# Patient Record
Sex: FEMALE | Race: WHITE | NOT HISPANIC OR LATINO | Employment: FULL TIME | URBAN - METROPOLITAN AREA
[De-identification: names, ages, dates, MRNs, and addresses within clinical notes are randomized per-mention and may not be internally consistent; named-entity substitution may affect disease eponyms.]

---

## 2023-04-21 LAB
ALANINE AMINOTRANSFERASE (SGPT) (U/L) IN SER/PLAS: 20 U/L (ref 7–45)
ALBUMIN (G/DL) IN SER/PLAS: 4.3 G/DL (ref 3.4–5)
ALKALINE PHOSPHATASE (U/L) IN SER/PLAS: 49 U/L (ref 33–136)
ANION GAP IN SER/PLAS: 9 MMOL/L (ref 10–20)
ASPARTATE AMINOTRANSFERASE (SGOT) (U/L) IN SER/PLAS: 25 U/L (ref 9–39)
BASOPHILS (10*3/UL) IN BLOOD BY AUTOMATED COUNT: 0.01 X10E9/L (ref 0–0.1)
BASOPHILS/100 LEUKOCYTES IN BLOOD BY AUTOMATED COUNT: 0.2 % (ref 0–2)
BILIRUBIN TOTAL (MG/DL) IN SER/PLAS: 0.6 MG/DL (ref 0–1.2)
C REACTIVE PROTEIN (MG/L) IN SER/PLAS: <0.1 MG/DL
CALCIUM (MG/DL) IN SER/PLAS: 9.6 MG/DL (ref 8.6–10.3)
CARBON DIOXIDE, TOTAL (MMOL/L) IN SER/PLAS: 30 MMOL/L (ref 21–32)
CHLORIDE (MMOL/L) IN SER/PLAS: 102 MMOL/L (ref 98–107)
CREATININE (MG/DL) IN SER/PLAS: 0.87 MG/DL (ref 0.5–1.05)
EOSINOPHILS (10*3/UL) IN BLOOD BY AUTOMATED COUNT: 0.03 X10E9/L (ref 0–0.7)
EOSINOPHILS/100 LEUKOCYTES IN BLOOD BY AUTOMATED COUNT: 0.7 % (ref 0–6)
ERYTHROCYTE DISTRIBUTION WIDTH (RATIO) BY AUTOMATED COUNT: 13.2 % (ref 11.5–14.5)
ERYTHROCYTE MEAN CORPUSCULAR HEMOGLOBIN CONCENTRATION (G/DL) BY AUTOMATED: 32.4 G/DL (ref 32–36)
ERYTHROCYTE MEAN CORPUSCULAR VOLUME (FL) BY AUTOMATED COUNT: 93 FL (ref 80–100)
ERYTHROCYTES (10*6/UL) IN BLOOD BY AUTOMATED COUNT: 4.27 X10E12/L (ref 4–5.2)
GFR FEMALE: 76 ML/MIN/1.73M2
GLUCOSE (MG/DL) IN SER/PLAS: 89 MG/DL (ref 74–99)
HEMATOCRIT (%) IN BLOOD BY AUTOMATED COUNT: 39.8 % (ref 36–46)
HEMOGLOBIN (G/DL) IN BLOOD: 12.9 G/DL (ref 12–16)
IMMATURE GRANULOCYTES/100 LEUKOCYTES IN BLOOD BY AUTOMATED COUNT: 0 % (ref 0–0.9)
LEUKOCYTES (10*3/UL) IN BLOOD BY AUTOMATED COUNT: 4.3 X10E9/L (ref 4.4–11.3)
LYMPHOCYTES (10*3/UL) IN BLOOD BY AUTOMATED COUNT: 2.06 X10E9/L (ref 1.2–4.8)
LYMPHOCYTES/100 LEUKOCYTES IN BLOOD BY AUTOMATED COUNT: 48.5 % (ref 13–44)
MONOCYTES (10*3/UL) IN BLOOD BY AUTOMATED COUNT: 0.46 X10E9/L (ref 0.1–1)
MONOCYTES/100 LEUKOCYTES IN BLOOD BY AUTOMATED COUNT: 10.8 % (ref 2–10)
NEUTROPHILS (10*3/UL) IN BLOOD BY AUTOMATED COUNT: 1.69 X10E9/L (ref 1.2–7.7)
NEUTROPHILS/100 LEUKOCYTES IN BLOOD BY AUTOMATED COUNT: 39.8 % (ref 40–80)
PLATELETS (10*3/UL) IN BLOOD AUTOMATED COUNT: 188 X10E9/L (ref 150–450)
POTASSIUM (MMOL/L) IN SER/PLAS: 4.1 MMOL/L (ref 3.5–5.3)
PROTEIN TOTAL: 7.3 G/DL (ref 6.4–8.2)
SODIUM (MMOL/L) IN SER/PLAS: 137 MMOL/L (ref 136–145)
UREA NITROGEN (MG/DL) IN SER/PLAS: 18 MG/DL (ref 6–23)

## 2023-07-24 LAB
ALANINE AMINOTRANSFERASE (SGPT) (U/L) IN SER/PLAS: 13 U/L (ref 7–45)
ALBUMIN (G/DL) IN SER/PLAS: 4.4 G/DL (ref 3.4–5)
ALKALINE PHOSPHATASE (U/L) IN SER/PLAS: 56 U/L (ref 33–136)
ANION GAP IN SER/PLAS: 10 MMOL/L (ref 10–20)
ASPARTATE AMINOTRANSFERASE (SGOT) (U/L) IN SER/PLAS: 20 U/L (ref 9–39)
BASOPHILS (10*3/UL) IN BLOOD BY AUTOMATED COUNT: 0.02 X10E9/L (ref 0–0.1)
BASOPHILS/100 LEUKOCYTES IN BLOOD BY AUTOMATED COUNT: 0.4 % (ref 0–2)
BILIRUBIN TOTAL (MG/DL) IN SER/PLAS: 0.6 MG/DL (ref 0–1.2)
C REACTIVE PROTEIN (MG/L) IN SER/PLAS: 0.1 MG/DL
CALCIUM (MG/DL) IN SER/PLAS: 9.4 MG/DL (ref 8.6–10.3)
CARBON DIOXIDE, TOTAL (MMOL/L) IN SER/PLAS: 30 MMOL/L (ref 21–32)
CHLORIDE (MMOL/L) IN SER/PLAS: 101 MMOL/L (ref 98–107)
CREATININE (MG/DL) IN SER/PLAS: 0.83 MG/DL (ref 0.5–1.05)
EOSINOPHILS (10*3/UL) IN BLOOD BY AUTOMATED COUNT: 0.04 X10E9/L (ref 0–0.7)
EOSINOPHILS/100 LEUKOCYTES IN BLOOD BY AUTOMATED COUNT: 0.8 % (ref 0–6)
ERYTHROCYTE DISTRIBUTION WIDTH (RATIO) BY AUTOMATED COUNT: 13.2 % (ref 11.5–14.5)
ERYTHROCYTE MEAN CORPUSCULAR HEMOGLOBIN CONCENTRATION (G/DL) BY AUTOMATED: 33.3 G/DL (ref 32–36)
ERYTHROCYTE MEAN CORPUSCULAR VOLUME (FL) BY AUTOMATED COUNT: 91 FL (ref 80–100)
ERYTHROCYTES (10*6/UL) IN BLOOD BY AUTOMATED COUNT: 4.28 X10E12/L (ref 4–5.2)
GFR FEMALE: 80 ML/MIN/1.73M2
GLUCOSE (MG/DL) IN SER/PLAS: 100 MG/DL (ref 74–99)
HEMATOCRIT (%) IN BLOOD BY AUTOMATED COUNT: 39 % (ref 36–46)
HEMOGLOBIN (G/DL) IN BLOOD: 13 G/DL (ref 12–16)
IMMATURE GRANULOCYTES/100 LEUKOCYTES IN BLOOD BY AUTOMATED COUNT: 0.2 % (ref 0–0.9)
LEUKOCYTES (10*3/UL) IN BLOOD BY AUTOMATED COUNT: 5 X10E9/L (ref 4.4–11.3)
LYMPHOCYTES (10*3/UL) IN BLOOD BY AUTOMATED COUNT: 2.01 X10E9/L (ref 1.2–4.8)
LYMPHOCYTES/100 LEUKOCYTES IN BLOOD BY AUTOMATED COUNT: 40 % (ref 13–44)
MONOCYTES (10*3/UL) IN BLOOD BY AUTOMATED COUNT: 0.53 X10E9/L (ref 0.1–1)
MONOCYTES/100 LEUKOCYTES IN BLOOD BY AUTOMATED COUNT: 10.5 % (ref 2–10)
NEUTROPHILS (10*3/UL) IN BLOOD BY AUTOMATED COUNT: 2.42 X10E9/L (ref 1.2–7.7)
NEUTROPHILS/100 LEUKOCYTES IN BLOOD BY AUTOMATED COUNT: 48.1 % (ref 40–80)
PLATELETS (10*3/UL) IN BLOOD AUTOMATED COUNT: 199 X10E9/L (ref 150–450)
POTASSIUM (MMOL/L) IN SER/PLAS: 4 MMOL/L (ref 3.5–5.3)
PROTEIN TOTAL: 7.2 G/DL (ref 6.4–8.2)
SODIUM (MMOL/L) IN SER/PLAS: 137 MMOL/L (ref 136–145)
UREA NITROGEN (MG/DL) IN SER/PLAS: 17 MG/DL (ref 6–23)

## 2023-08-17 LAB
CHOLESTEROL (MG/DL) IN SER/PLAS: 206 MG/DL (ref 0–199)
CHOLESTEROL IN HDL (MG/DL) IN SER/PLAS: 86.8 MG/DL
CHOLESTEROL/HDL RATIO: 2.4
FASTING GLUCOSE (MG/DL) IN SER/PLAS: 92 MG/DL (ref 74–99)
LDL: 107 MG/DL (ref 0–99)
TRIGLYCERIDE (MG/DL) IN SER/PLAS: 59 MG/DL (ref 0–149)
VLDL: 12 MG/DL (ref 0–40)

## 2023-09-23 PROBLEM — M24.821: Status: ACTIVE | Noted: 2023-09-23

## 2023-09-23 PROBLEM — L40.50 PSORIATIC ARTHROPATHY (MULTI): Status: ACTIVE | Noted: 2023-09-23

## 2023-09-23 PROBLEM — M77.8 TENDINITIS OF RIGHT ELBOW: Status: ACTIVE | Noted: 2023-09-23

## 2023-09-23 PROBLEM — N20.0 KIDNEY STONE: Status: ACTIVE | Noted: 2023-09-23

## 2023-09-23 PROBLEM — E55.9 VITAMIN D DEFICIENCY: Status: ACTIVE | Noted: 2023-09-23

## 2023-09-23 PROBLEM — R59.0 LYMPHADENOPATHY OF HEAD AND NECK REGION: Status: ACTIVE | Noted: 2023-09-23

## 2023-09-23 PROBLEM — R53.83 FATIGUE: Status: ACTIVE | Noted: 2023-09-23

## 2023-09-23 PROBLEM — H69.92 DYSFUNCTION OF LEFT EUSTACHIAN TUBE: Status: ACTIVE | Noted: 2023-09-23

## 2023-09-23 PROBLEM — S52.90XA RADIUS FRACTURE: Status: ACTIVE | Noted: 2023-09-23

## 2023-09-23 PROBLEM — E78.5 HYPERLIPEMIA: Status: ACTIVE | Noted: 2023-09-23

## 2023-09-23 PROBLEM — N13.30 HYDRONEPHROSIS: Status: ACTIVE | Noted: 2023-09-23

## 2023-09-23 PROBLEM — M62.830 SPASM OF THORACIC BACK MUSCLE: Status: ACTIVE | Noted: 2023-09-23

## 2023-09-23 PROBLEM — M24.521: Status: ACTIVE | Noted: 2023-09-23

## 2023-09-23 RX ORDER — FOLIC ACID 1 MG/1
1 TABLET ORAL DAILY
COMMUNITY
Start: 2013-01-29 | End: 2023-11-09

## 2023-09-23 RX ORDER — ACETAMINOPHEN 500 MG
1 TABLET ORAL DAILY
COMMUNITY
Start: 2014-02-07

## 2023-09-23 RX ORDER — METHOTREXATE 2.5 MG/1
6 TABLET ORAL
COMMUNITY
Start: 2013-10-16 | End: 2023-10-16

## 2023-09-23 RX ORDER — FERROUS SULFATE 325(65) MG
TABLET ORAL
COMMUNITY

## 2023-09-23 RX ORDER — ADALIMUMAB 40MG/0.4ML
40 KIT SUBCUTANEOUS
COMMUNITY
Start: 2015-04-07 | End: 2023-12-18 | Stop reason: SDUPTHER

## 2023-09-23 RX ORDER — ACETAMINOPHEN 160 MG/5ML
SUSPENSION, ORAL (FINAL DOSE FORM) ORAL
COMMUNITY

## 2023-10-10 ENCOUNTER — TELEPHONE (OUTPATIENT)
Dept: PRIMARY CARE | Facility: CLINIC | Age: 63
End: 2023-10-10

## 2023-10-10 NOTE — TELEPHONE ENCOUNTER
Patient pulled her left glut a couple of weeks ago. She has tried topical creams and is stretching everyday, but it is still painful and now it is causing pain in her left knee.

## 2023-10-12 ENCOUNTER — OFFICE VISIT (OUTPATIENT)
Dept: PRIMARY CARE | Facility: CLINIC | Age: 63
End: 2023-10-12
Payer: COMMERCIAL

## 2023-10-12 VITALS
HEIGHT: 64 IN | DIASTOLIC BLOOD PRESSURE: 77 MMHG | SYSTOLIC BLOOD PRESSURE: 116 MMHG | HEART RATE: 69 BPM | OXYGEN SATURATION: 97 % | BODY MASS INDEX: 24.07 KG/M2 | WEIGHT: 141 LBS

## 2023-10-12 DIAGNOSIS — M54.16 LUMBAR RADICULOPATHY: Primary | ICD-10-CM

## 2023-10-12 PROCEDURE — 99213 OFFICE O/P EST LOW 20 MIN: CPT | Performed by: INTERNAL MEDICINE

## 2023-10-12 RX ORDER — PREDNISONE 20 MG/1
40 TABLET ORAL DAILY
Qty: 14 TABLET | Refills: 0 | Status: SHIPPED | OUTPATIENT
Start: 2023-10-12 | End: 2023-10-19

## 2023-10-12 NOTE — PROGRESS NOTES
"Subjective   Patient ID: Maryann Brennan is a 63 y.o. female who presents for Muscle Pain.    HPI   Felt L low back/buttocks muscles were tight about a month ago. Pain radiating around L hip and into L thigh/knee which started over the past two weeks. Hard to sleep.  Using some topicals. Tried motrin, aleve, nothing helping.  Pain is somewhat improved from onset.  L hip area feels tighter, notices when doing stretches, sitting cross legged.  No injury or overuse prior to onset.  No numbness or tingling in the left leg.    Review of Systems    Objective   /77   Pulse 69   Ht 1.626 m (5' 4\")   Wt 64 kg (141 lb)   SpO2 97%   BMI 24.20 kg/m²     Physical Exam  Constitutional:       General: She is not in acute distress.  Musculoskeletal:      Cervical back: No tenderness.      Thoracic back: No tenderness.      Lumbar back: No tenderness.      Comments: Full ROM of L hip but some pain elicited in L groin with internal rotation.  Straight leg raise + on L   Neurological:      Motor: No weakness.      Gait: Gait normal.      Deep Tendon Reflexes:      Reflex Scores:       Patellar reflexes are 2+ on the right side and 2+ on the left side.        Assessment/Plan   Problem List Items Addressed This Visit             ICD-10-CM    Lumbar radiculopathy - Primary M54.16    Relevant Medications    predniSONE (Deltasone) 20 mg tablet    Other Relevant Orders    Referral to Physical Therapy          Will treat with prednisone course and start PT. Advised to return after 6 weeks of PT if not significantly better.  "

## 2023-10-14 DIAGNOSIS — L40.50 PSORIATIC ARTHRITIS (MULTI): Primary | ICD-10-CM

## 2023-10-16 RX ORDER — METHOTREXATE 2.5 MG/1
TABLET ORAL
Qty: 24 TABLET | Refills: 2 | Status: SHIPPED | OUTPATIENT
Start: 2023-10-16 | End: 2024-01-08

## 2023-10-23 ENCOUNTER — OFFICE VISIT (OUTPATIENT)
Dept: RHEUMATOLOGY | Facility: CLINIC | Age: 63
End: 2023-10-23
Payer: COMMERCIAL

## 2023-10-23 ENCOUNTER — LAB (OUTPATIENT)
Dept: LAB | Facility: LAB | Age: 63
End: 2023-10-23
Payer: COMMERCIAL

## 2023-10-23 VITALS
HEART RATE: 76 BPM | TEMPERATURE: 96.7 F | BODY MASS INDEX: 24.41 KG/M2 | HEIGHT: 64 IN | OXYGEN SATURATION: 98 % | WEIGHT: 143 LBS | SYSTOLIC BLOOD PRESSURE: 110 MMHG | DIASTOLIC BLOOD PRESSURE: 70 MMHG

## 2023-10-23 DIAGNOSIS — L40.50 PSORIATIC ARTHROPATHY (MULTI): Primary | ICD-10-CM

## 2023-10-23 DIAGNOSIS — L40.50 PSORIATIC ARTHROPATHY (MULTI): ICD-10-CM

## 2023-10-23 DIAGNOSIS — M85.89 OSTEOPENIA OF MULTIPLE SITES: ICD-10-CM

## 2023-10-23 PROBLEM — J20.9 ACUTE BRONCHITIS: Status: ACTIVE | Noted: 2023-10-23

## 2023-10-23 PROBLEM — J01.10 ACUTE FRONTAL SINUSITIS: Status: ACTIVE | Noted: 2023-10-23

## 2023-10-23 PROBLEM — H61.20 IMPACTED CERUMEN: Status: ACTIVE | Noted: 2023-10-23

## 2023-10-23 PROBLEM — R05.9 COUGH: Status: ACTIVE | Noted: 2023-10-23

## 2023-10-23 PROBLEM — R51.9 HEADACHE: Status: ACTIVE | Noted: 2023-10-23

## 2023-10-23 PROBLEM — N20.0 KIDNEY STONE: Status: RESOLVED | Noted: 2023-09-23 | Resolved: 2023-10-23

## 2023-10-23 PROBLEM — R35.0 INCREASED FREQUENCY OF URINATION: Status: ACTIVE | Noted: 2023-10-23

## 2023-10-23 PROBLEM — M71.9 DISORDER OF BURSAE OF SHOULDER REGION: Status: ACTIVE | Noted: 2018-04-20

## 2023-10-23 PROBLEM — M71.9 BURSITIS: Status: ACTIVE | Noted: 2023-10-23

## 2023-10-23 PROBLEM — M10.9 GOUT: Status: ACTIVE | Noted: 2023-10-23

## 2023-10-23 PROBLEM — H66.90 OTITIS MEDIA: Status: ACTIVE | Noted: 2023-10-23

## 2023-10-23 PROBLEM — N95.2 ATROPHY OF VAGINA: Status: ACTIVE | Noted: 2022-09-26

## 2023-10-23 PROBLEM — F41.1 ANXIETY STATE: Status: ACTIVE | Noted: 2023-10-23

## 2023-10-23 PROBLEM — N39.0 URINARY TRACT INFECTIOUS DISEASE: Status: ACTIVE | Noted: 2018-04-20

## 2023-10-23 PROBLEM — M79.7 FIBROMYOSITIS: Status: ACTIVE | Noted: 2023-10-23

## 2023-10-23 PROBLEM — J02.9 ACUTE PHARYNGITIS: Status: ACTIVE | Noted: 2023-10-23

## 2023-10-23 PROBLEM — N13.30 HYDRONEPHROSIS: Status: RESOLVED | Noted: 2023-09-23 | Resolved: 2023-10-23

## 2023-10-23 PROBLEM — L40.9 PSORIASIS: Status: ACTIVE | Noted: 2023-10-23

## 2023-10-23 LAB
ALBUMIN SERPL BCP-MCNC: 4.2 G/DL (ref 3.4–5)
ALP SERPL-CCNC: 67 U/L (ref 33–136)
ALT SERPL W P-5'-P-CCNC: 15 U/L (ref 7–45)
ANION GAP SERPL CALC-SCNC: 8 MMOL/L (ref 10–20)
AST SERPL W P-5'-P-CCNC: 17 U/L (ref 9–39)
BASOPHILS # BLD AUTO: 0.02 X10*3/UL (ref 0–0.1)
BASOPHILS NFR BLD AUTO: 0.3 %
BILIRUB SERPL-MCNC: 0.4 MG/DL (ref 0–1.2)
BUN SERPL-MCNC: 18 MG/DL (ref 6–23)
CALCIUM SERPL-MCNC: 9.3 MG/DL (ref 8.6–10.3)
CHLORIDE SERPL-SCNC: 100 MMOL/L (ref 98–107)
CO2 SERPL-SCNC: 33 MMOL/L (ref 21–32)
CREAT SERPL-MCNC: 0.81 MG/DL (ref 0.5–1.05)
CRP SERPL-MCNC: 0.19 MG/DL
EOSINOPHIL # BLD AUTO: 0.08 X10*3/UL (ref 0–0.7)
EOSINOPHIL NFR BLD AUTO: 1.3 %
ERYTHROCYTE [DISTWIDTH] IN BLOOD BY AUTOMATED COUNT: 13.5 % (ref 11.5–14.5)
GFR SERPL CREATININE-BSD FRML MDRD: 83 ML/MIN/1.73M*2
GLUCOSE SERPL-MCNC: 87 MG/DL (ref 74–99)
HCT VFR BLD AUTO: 44 % (ref 36–46)
HGB BLD-MCNC: 14.2 G/DL (ref 12–16)
IMM GRANULOCYTES # BLD AUTO: 0.03 X10*3/UL (ref 0–0.7)
IMM GRANULOCYTES NFR BLD AUTO: 0.5 % (ref 0–0.9)
LYMPHOCYTES # BLD AUTO: 2.31 X10*3/UL (ref 1.2–4.8)
LYMPHOCYTES NFR BLD AUTO: 36.5 %
MCH RBC QN AUTO: 30.3 PG (ref 26–34)
MCHC RBC AUTO-ENTMCNC: 32.3 G/DL (ref 32–36)
MCV RBC AUTO: 94 FL (ref 80–100)
MONOCYTES # BLD AUTO: 1.04 X10*3/UL (ref 0.1–1)
MONOCYTES NFR BLD AUTO: 16.4 %
NEUTROPHILS # BLD AUTO: 2.85 X10*3/UL (ref 1.2–7.7)
NEUTROPHILS NFR BLD AUTO: 45 %
NRBC BLD-RTO: 0 /100 WBCS (ref 0–0)
PLATELET # BLD AUTO: 232 X10*3/UL (ref 150–450)
PMV BLD AUTO: 9 FL (ref 7.5–11.5)
POTASSIUM SERPL-SCNC: 4.4 MMOL/L (ref 3.5–5.3)
PROT SERPL-MCNC: 7 G/DL (ref 6.4–8.2)
RBC # BLD AUTO: 4.68 X10*6/UL (ref 4–5.2)
SODIUM SERPL-SCNC: 137 MMOL/L (ref 136–145)
WBC # BLD AUTO: 6.3 X10*3/UL (ref 4.4–11.3)

## 2023-10-23 PROCEDURE — 36415 COLL VENOUS BLD VENIPUNCTURE: CPT

## 2023-10-23 PROCEDURE — 1036F TOBACCO NON-USER: CPT | Performed by: INTERNAL MEDICINE

## 2023-10-23 PROCEDURE — 85025 COMPLETE CBC W/AUTO DIFF WBC: CPT

## 2023-10-23 PROCEDURE — 99214 OFFICE O/P EST MOD 30 MIN: CPT | Performed by: INTERNAL MEDICINE

## 2023-10-23 PROCEDURE — 80053 COMPREHEN METABOLIC PANEL: CPT

## 2023-10-23 PROCEDURE — 86140 C-REACTIVE PROTEIN: CPT

## 2023-10-23 ASSESSMENT — PATIENT HEALTH QUESTIONNAIRE - PHQ9
SUM OF ALL RESPONSES TO PHQ9 QUESTIONS 1 AND 2: 0
2. FEELING DOWN, DEPRESSED OR HOPELESS: NOT AT ALL
1. LITTLE INTEREST OR PLEASURE IN DOING THINGS: NOT AT ALL
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
## 2023-10-31 ENCOUNTER — EVALUATION (OUTPATIENT)
Dept: PHYSICAL THERAPY | Facility: CLINIC | Age: 63
End: 2023-10-31
Payer: COMMERCIAL

## 2023-10-31 VITALS — OXYGEN SATURATION: 98 % | HEART RATE: 72 BPM

## 2023-10-31 DIAGNOSIS — M54.16 LUMBAR RADICULOPATHY: ICD-10-CM

## 2023-10-31 PROCEDURE — 97161 PT EVAL LOW COMPLEX 20 MIN: CPT | Mod: GP

## 2023-10-31 PROCEDURE — 97530 THERAPEUTIC ACTIVITIES: CPT | Mod: GP

## 2023-10-31 ASSESSMENT — PATIENT HEALTH QUESTIONNAIRE - PHQ9
SUM OF ALL RESPONSES TO PHQ9 QUESTIONS 1 AND 2: 0
2. FEELING DOWN, DEPRESSED OR HOPELESS: NOT AT ALL
2. FEELING DOWN, DEPRESSED OR HOPELESS: NOT AT ALL
SUM OF ALL RESPONSES TO PHQ9 QUESTIONS 1 AND 2: 0
1. LITTLE INTEREST OR PLEASURE IN DOING THINGS: NOT AT ALL
1. LITTLE INTEREST OR PLEASURE IN DOING THINGS: NOT AT ALL

## 2023-10-31 ASSESSMENT — PAIN DESCRIPTION - DESCRIPTORS: DESCRIPTORS: ACHING;DULL

## 2023-10-31 ASSESSMENT — PAIN SCALES - GENERAL: PAINLEVEL_OUTOF10: 2

## 2023-10-31 ASSESSMENT — ACTIVITIES OF DAILY LIVING (ADL): ADL_ASSISTANCE: INDEPENDENT

## 2023-10-31 ASSESSMENT — PAIN - FUNCTIONAL ASSESSMENT: PAIN_FUNCTIONAL_ASSESSMENT: 0-10

## 2023-10-31 NOTE — Clinical Note
October 31, 2023     Patient: Maryann Brennan   YOB: 1960   Date of Visit: 10/31/2023       To Whom It May Concern:    It is my medical opinion that Maryann Brennan {Work release (duty restriction):85605}.    If you have any questions or concerns, please don't hesitate to call.         Sincerely,        Zoila Barrett, PT    CC: No Recipients

## 2023-10-31 NOTE — Clinical Note
October 31, 2023     Patient: Maryann Brennan   YOB: 1960   Date of Visit: 10/31/2023       To Whom it May Concern:    Maryann Brennan was seen in my clinic on 10/31/2023. She {Return to school/sport:44236}.    If you have any questions or concerns, please don't hesitate to call.         Sincerely,          Zoila Barrett, PT        CC: No Recipients

## 2023-10-31 NOTE — PROGRESS NOTES
Physical Therapy    Physical Therapy Evaluation and Treatment      Patient Name: Maryann Brennan  MRN: [id number]  Today's Date: 10/31/2023  Time Calculation  Start Time: 0750  Stop Time: 0835  Time Calculation (min): 45 min    Billing: Minutes  Evaluation:  Evaluation:  (Low):  25  Treatment:   Therapeutic Activity:  20    Assessment:  63yr F pt presents to physical therapy with complaints of L glute pain that at times radiates down into the L LE down to the knee. During examination patient has limitations in hip AROM/PROM specifically IR on the L LE; decreased proximal hip strength, decreased static balance, minimal gait dysfunction, decreased functional mobility and tolerance with activity. Patient is not a fall risk at this time.pt would benefit from continued skilled physical therapy to maximize pt safety, increase tolerance to activity and to address impairments to restore PLOF with ADLs, functional mobility and participation in activities.    PT Assessment Results: Decreased strength, Decreased range of motion, Decreased mobility, Pain  Evaluation/Treatment Tolerance: Patient tolerated treatment well  Strengths: Ability to acquire knowledge    Plan:  Treatment/Interventions: Cryotherapy, Education/ Instruction, Gait training, Manual therapy, Neuromuscular re-education, Self care/ home management, Therapeutic activities, Therapeutic exercises  PT Plan: Skilled PT, Other (Comment) (Start on HEP for LE Proximal Hip Strengthening; Nustep warm up; Review stretches for piriformis)  PT Frequency: Other (Comment) (Initially 2x/week then progress to 1x/week)  Duration: 4 weeks  Onset Date: 10/12/23  Rehab Potential: Good  Plan of Care Agreement: Patient  Visit #1   Insurance Reviewed (per information provided by  pre-cert team)  Authorization required:  No   Preferred Name:  Maryann Brand      Current Problem:   1. Lumbar radiculopathy  Referral to Physical Therapy    Follow Up In Physical Therapy          Subjective     General:  General  Reason for Referral: L Hip/Glute Radiculopathy  Referred By: Peter Luevano MD  Past Medical History Relevant to Rehab: Anemia, Psoriatic Arthritis, Wrist Fx R ; Wrist Fx Left  (Plate)  General Comment: Per pt report she thinks she hurt herself during exercising - can usually work through it when she changes her exercises; she had a stretch were the pain was keeping her up at night. She could feel it start on her L Glute/Hip and radiate down into her L knee; Ibprofun did not help; aleve did not help; had nights with tossing and turning; she can sleep if she was on her stomach with her L Leg up. She was put on 7 days of predisone. It got better; she is able to sleep through the night. Sometimes she feels a knot in her glute and feels like she can get a release and sometimes she can feel a spot that goes all the way down the ankle; has been completing the figure 4 and pirfiformis stretches and it has helped some; she knows her L hip tighter than it used to be; has been foam rolling her L Glute and hip flexors; has also been massage gunning. Wants better flexibility, and to decrease pain. She knows she has to be more careful when she sits; more increased pain levels at night. Reports no falls in the last 6 months; fell in  on the ice and broke her wrist; fell on her glutes. Reports she did have difficulty lifting her leg going up the steps and getting into the car - it has been a couple of weeks since then and now she can do the steps.  Precautions:  Precautions  Medical Precautions: No known precautions/limitation  Precautions Comment: Low Fall Risk  Vital Signs:  Vital Signs  Heart Rate: 72  SpO2: 98 %  Pain:  Pain Assessment  Pain Assessment: 0-10  Pain Score: 2  Pain Type: Chronic pain  Pain Location: Hip (Glute)  Pain Orientation: Left  Pain Descriptors: Aching, Dull  Pain Frequency: Intermittent  Home Livin story home; 2 stairs; 10 steps to 2nd/floor and to basement (HR  uses intermittently); 2nd floor bed/bath - tub shower with grab bar   Prior Level of Function:  Prior Function Per Pt/Caregiver Report  Level of Downs: Independent with ADLs and functional transfers, Independent with homemaking with ambulation  ADL Assistance: Independent  Homemaking Assistance: Independent  Ambulatory Assistance: Independent  Vocational: Full time employment (Work at [employer])  Hand Dominance: Right    Objective   General Assessments:  Posture Comment: Seated: rounded shoulders, mod fwd head, minimal increased thoracic kyohosis    Palpation Comment: No pain with palpation to the L/S and L Hip   Special Tests Comment: OLIVIA (+) L Hip; Scour (-) in BL  Functional Assessments:  Gait Comment: Minimal anatalgic gait noted; ambulates for 75+ ft in clinic with no AD; decreased heel strike/push off on L LE  , Balance Comment: 4 Stage Balance: NBOS, 10s, R SemiTandem: 10s, R Tandem; 2s; SLS 10s BL; L LE Stance Leg Symptomatic    , Stairs Comment: Ascends/Descends 4 6in steps in a step over step pattern with no use of rail; good speed and jacqueline  , Bed Mobility Comment: Mat table mobility IND  , and Transfers Comment: IND  Extremity/Trunk Assessments:  RLE   RLE : Exceptions to WFL  AROM RLE (degrees)  RLE AROM Comment: WFL  PROM RLE (degrees)  RLE PROM Comment: WFL  Strength RLE  RLE Overall Strength: Deficits  R Hip Flexion: 4+/5  R Hip ABduction: 4/5  R Hip ADduction: 4/5  R Knee Flexion: 5/5  R Knee Extension: 5/5  R Ankle Dorsiflexion: 5/5  R Ankle Plantar Flexion: 5/5  LLE   LLE : Exceptions to WFL  AROM LLE (degrees)  LLE AROM Comment: WFL  PROM LLE (degrees)  LLE PROM Comment: WFL except L IR (Mod Defificits)  Strength LLE  L Hip Flexion: 4/5  L Hip ABduction: 4/5  L Hip ADduction: 4/5  L Knee Flexion: 4/5  L Knee Extension: 5/5  L Ankle Dorsiflexion: 5/5  L Ankle Plantar Flexion: 5/5    Lumbar Spine  Lumbar Palpation/Joint Mobility Assessment  Palpation/Joint Mobility Comment: No pain with  palpation to the L/S and Sacrum  Lumbar AROM  Lumbar AROM WFL: yes  Lumbar flexion: (60°): WFL  Lumbar extension (25°): WFL; Pressure/Tightness on L Glute  Lumbar rotation right (30°): WFL  Lumbar rotation left (30°): WFL  Lumbar sidebend right (25°): WFL  Lumbar sidebend left (25°): WFL  Hip AROM  Hip AROM WFL: no  R Hip Flexion (125°): 125  L Hip Flexion (125°): 125  R Hip Abduction (45°): 45  L Hip Abduction  (45°): 45 pain with movement at end range  R Hip Extension (10°): 10  L Hip Extension (10°): 10  R hip ER: (45°): 45  L hip ER: (45°): 45  R hip IR: (45°): 45  L hip IR: (45°): 10; painful  Lumbar Myotomes  Lumbar Myotomes WFL: no  R Hip Flex : 4+/5  L Hip Flex (L2): (5/5): 4/5  R Knee Ext (L3): (5/5): 5/5  L Knee Ext (L3): (5/5) : 4/5  R Ankle DF (L4): (5/5): 5/5  L Ankle DF (L4): (5/5): 5/5  R Great Toe Ext (L5): (5/5) : 5/5  L Great Toe Ext (L5): (5/5): 5/5  R Ankle EV (S1): (5/5): 5/5  L Ankle EV (S1): (5/5): 5/5  Specific Lower Extremity MMT  Specific Lower Extremity MMT WFL: no  R Gluteals (prone): (5/5): 4/5  L Gluteals (prone): (5/5): 4-/5  R Hip External Rotation: (5/5): 5/5  L Hip External Rotation: (5/5): 5/5  Dermatomes  Dermatomes WFL: yes  R Anterior / Medial thigh ( L2, L3): WFL  L Anterior / Medial thigh ( L2, L3): WFL  R Medial ankle (L4): WFL  L Medial ankle (L4): WFL  R Lateral calf/dorsal/plantar surface of foot, digit 1-4 (L5): WFL  L Lateral calf/dorsal/plantar surface of foot, digit 1-4 (L5): WFL  R Digit 5 (S1): WFL  L Digit 5 (S1): WFL  Special Tests  Supine SLR: (Negative): (+) > 90 L LE; R LE WNL     Outcome Measures:  Other Measures  5x Sit to Stand: 11.49 with no UE assist, standardized chair height  Oswestry Disablity Index (RAFAEL): 0/50     Treatments:  Therapeutic Activity  Therapeutic Activity Performed: Yes  1. Repeated sit to/from stands from standardized chair height. Required VC for no use of hands, nose over toes, full upright stand from chair, use of anterior shift with  fwd momentum and eccentric control into chair.  2. Functional Performance via stairs: Ascends/Descends 4 6in steps at close supervision with no assistive device, no use of HR   3. Functional mobility via AROM/PROM of LE; Verbal cues for sequencing/positioning.  4. Functional Performance via MMT of LE: Hip, knee, ankle; Verbal cues for sequencing/positioning.   5. Educated pt on POC, goals of physical therapy, purpose of physical therapy, as well as the benefits in participating in physical therapy to increase functional mobility and maximize pt safety.     OP EDUCATION:  Education  Individual(s) Educated: Patient  Education Provided: Anatomy, Body Mechanics, Fall Risk, Home Exercise Program, Home Safety, POC, Physiology, Posture  Risk and Benefits Discussed with Patient/Caregiver/Other: yes  Patient/Caregiver Demonstrated Understanding: yes  Plan of Care Discussed and Agreed Upon: yes  Patient Response to Education: Patient/Caregiver Verbalized Understanding of Information, Patient/Caregiver Performed Return Demonstration of Exercises/Activities, Patient/Caregiver Asked Appropriate Questions    Goals:  STG: pt will be independent in HEP & symptom management    LTGs:  Pain: pt will demonstrate a 2 pt improvement pain on the VAS scale, allowing for improved tolerance to perform daily functional activities.     ROM: pt will demonstrate no losses in PROM/AROM in the Hip without onset of pain, allowing for a normal gait pattern.     Strength: Pt will improve B LE Strength to >/= 4+/5 to increase functional performance, tolerance to activity, and enhancing pt safety to particpate in ADLs and IADLs.    Gait: pt will demonstrate normal mechanics without pain during gait and performance of stairs, allowing for pt to return to navigating the community.     5xSTS: pt will perform 5x sit to  < 15 seconds to decrease fall risk. OR MCID 2.3s Baseline 11.49sec (10/31/23)     Stairs: pt will ascend/descend step over step  (6in step) with proper gait mechanics and use of <1HR to promote functional mobility and improve functional performance.

## 2023-11-06 ENCOUNTER — TREATMENT (OUTPATIENT)
Dept: PHYSICAL THERAPY | Facility: CLINIC | Age: 63
End: 2023-11-06
Payer: COMMERCIAL

## 2023-11-06 DIAGNOSIS — M54.16 LUMBAR RADICULOPATHY: ICD-10-CM

## 2023-11-06 PROCEDURE — 97110 THERAPEUTIC EXERCISES: CPT | Mod: GP

## 2023-11-06 ASSESSMENT — PAIN SCALES - GENERAL: PAINLEVEL_OUTOF10: 4

## 2023-11-06 ASSESSMENT — PAIN - FUNCTIONAL ASSESSMENT: PAIN_FUNCTIONAL_ASSESSMENT: 0-10

## 2023-11-06 NOTE — PROGRESS NOTES
Physical Therapy    Physical Therapy Progress Note     Patient Name: Maryann Brennan  MRN: [id number]  Today's Date: 11/6/2023  Time Calculation  Start Time: 1100  Stop Time: 1140  Time Calculation (min): 40 min  Billing: Minutes   Treatment:   Therapeutic Exercise:  38      Assessment:  PT Assessment  PT Assessment Results: Decreased strength, Decreased range of motion, Decreased mobility, Pain  Evaluation/Treatment Tolerance: Patient tolerated treatment well  Strengths: Ability to acquire knowledge  pt tolerated treatment well, did well with initiation of LE supine proximal hip strengthening. pt needs continued work on strengthening and core stability paired with proximal hip strengthening. pt left session with all questions answered.     Plan:  OP PT Plan  Treatment/Interventions: Cryotherapy, Education/ Instruction, Gait training, Manual therapy, Neuromuscular re-education, Self care/ home management, Therapeutic activities, Therapeutic exercises  PT Plan: Skilled PT (Hip hikes, nu step, stair stretches, squats, progress bridges and LE supine exercises)   PT Frequency: Other (Comment) (Initially 2x/week then progress to 1x/week)  Duration: 4 weeks  Onset Date: 10/12/23  Rehab Potential: Good  Plan of Care Agreement: Patient  Visit #2   Insurance Reviewed (per information provided by  pre-cert team)  Authorization required:  No   Preferred Name:  Maryann Brand    Current Problem  1. Lumbar radiculopathy  Follow Up In Physical Therapy          Subjective   General  Reason for Referral: L Hip/Glute Radiculopathy  Referred By: Peter Luevano MD  Past Medical History Relevant to Rehab: Anemia, Psoriatic Arthritis, Wrist Fx R 1998; Wrist Fx Left 2022 (Plate)  General Comment: Per pt report was in the car for 4.5hrs and felt a little stiff after but nothing different from usual. Woke up a few times last night and had achy pain in the L glute  Precautions  Precautions  Medical Precautions: No known  precautions/limitation  Precautions Comment: Low Fall Risk  Pain  Pain Assessment: 0-10  Pain Score: 4  Pain Type: Chronic pain  Pain Location: Other (Comment) (Glute)  Pain Orientation: Left    Objective   Posture  Posture Comment: Rounded shoulders; increased thoracici kyphosis    Gait: Ambulates in clinic for 75+ft in clinic with minimal anatalgic gait noted; decreased heel strike/push off on L LE     Treatments:  Therapeutic Exercise  Therapeutic Exercise Performed: Yes  Therapeutic Exercise Activity 1: Nustep for 8 min; work level 3 to increase functional mobility and tolerance with activity  Therapeutic Exercise Activity 2: Bridge with ball squeeze 2x15  Therapeutic Exercise Activity 3: SLR BL 2x15 with active quad set  Therapeutic Exercise Activity 4: SL Clamshell x15  Therapeutic Exercise Activity 5: SL Hip Flexion Bent Knee: x  Therapeutic Exercise Activity 6: SL Hip Circles CW/CCW x10 BL  Therapeutic Exercise Activity 7: Supine Marches x15  Therapeutic Exercise Activity 8: Supine Piriformis Stretch BL 2x30s  Therapeutic Exercise Activity 9: Supine Birddog x10 BL    OP EDUCATION:  Education  Individual(s) Educated: Patient  Education Provided: Anatomy, Body Mechanics, Fall Risk, Home Exercise Program, Home Safety, POC, Physiology, Posture  Risk and Benefits Discussed with Patient/Caregiver/Other: yes  Patient/Caregiver Demonstrated Understanding: yes  Plan of Care Discussed and Agreed Upon: yes  Patient Response to Education: Patient/Caregiver Verbalized Understanding of Information, Patient/Caregiver Performed Return Demonstration of Exercises/Activities, Patient/Caregiver Asked Appropriate Questions  Access Code: [email or web address]  URL: [email or web address]  Date: 11/06/2023  Prepared by: Zoila Barrett    Exercises  - Bird Dog  - 1 x daily - 3 sets - 10 reps  - Supine March  - 1 x daily - 3 sets - 10 reps  - Supine Piriformis Stretch with Foot on Ground  - 1 x daily - 3 sets - 10 reps  -  Supine Bridge with Mini Swiss Ball Between Knees  - 1 x daily - 3 sets - 10 reps  - Active Straight Leg Raise with Quad Set  - 1 x daily - 3 sets - 10 reps  - Sidelying Hip Circles  - 1 x daily - 3 sets - 10 reps  - Sidelying Bent Knee Hip Flexion  - 1 x daily - 3 sets - 10 reps  - Clamshell  - 1 x daily - 3 sets - 10 reps

## 2023-11-09 ENCOUNTER — TREATMENT (OUTPATIENT)
Dept: PHYSICAL THERAPY | Facility: CLINIC | Age: 63
End: 2023-11-09
Payer: COMMERCIAL

## 2023-11-09 DIAGNOSIS — M54.16 LUMBAR RADICULOPATHY: ICD-10-CM

## 2023-11-09 DIAGNOSIS — L40.50 PSORIATIC ARTHRITIS (MULTI): Primary | ICD-10-CM

## 2023-11-09 PROCEDURE — 97110 THERAPEUTIC EXERCISES: CPT | Mod: GP,CQ

## 2023-11-09 RX ORDER — FOLIC ACID 1 MG/1
1 TABLET ORAL DAILY
Qty: 30 TABLET | Refills: 5 | Status: SHIPPED | OUTPATIENT
Start: 2023-11-09 | End: 2024-05-03

## 2023-11-09 ASSESSMENT — PAIN DESCRIPTION - DESCRIPTORS: DESCRIPTORS: ACHING;DULL

## 2023-11-09 ASSESSMENT — PAIN - FUNCTIONAL ASSESSMENT: PAIN_FUNCTIONAL_ASSESSMENT: 0-10

## 2023-11-09 ASSESSMENT — PAIN SCALES - GENERAL: PAINLEVEL_OUTOF10: 4

## 2023-11-09 NOTE — PROGRESS NOTES
Physical Therapy    Physical Therapy Treatment    Patient Name: Maryann Brennan  MRN: [id number]  Today's Date: 11/9/2023  Time Calculation  Start Time: 1050  Stop Time: 1135  Time Calculation (min): 45 min  Billing:  Therex:  45 minutes      Assessment:   Reviewed HEP.  Popping in L. Hip joint with sidelying hip flexion, minimized with decreased abduction in sidelying.  Progressed patient with hip hikes, partial squats, and isometric piriformis ex.  Added addition of resistance to clamshells.  Pt. Able to tolerate advancements in program without increased symptoms.    Skilled PT:  Therapeutic exercise progression, patient education    Plan:   OP PT Plan  Treatment/Interventions: Cryotherapy, Education/ Instruction, Gait training, Manual therapy, Neuromuscular re-education, Self care/ home management, Therapeutic activities, Therapeutic exercises  PT Frequency: Other (Comment) (Initially 2x/week then progress to 1x/week)  Duration: 4 weeks  Onset Date: 10/12/23  Rehab Potential: Good  Plan of Care Agreement: Patient  Issue RTB for clamshells at home and add eccentric tap downs next visit.     Visit #3  Insurance Reviewed (per information provided by  pre-cert team)  Authorization required:  No   Preferred Name:  Maryann Brand  Current Problem  1. Lumbar radiculopathy  Follow Up In Physical Therapy          Subjective   General  Reason for Referral: L Hip/Glute Radiculopathy  Referred By: Peter Luevano MD  Past Medical History Relevant to Rehab: Anemia, Psoriatic Arthritis, Wrist Fx R 1998; Wrist Fx Left 2022 (Plate)    Precautions  Medical Precautions: No known precautions/limitation  Precautions Comment: Low Fall Risk     Pain  Pain Assessment: 0-10  Pain Score: 4  Pain Type: Chronic pain  Pain Location: Hip  Pain Orientation: Left  Pain Descriptors: Aching, Dull  Pain Frequency: Intermittent    Objective     Posture   Valgus presentation of amari knees in standing initially.    Treatments:   Therapeutic Exercise  "Performed: Yes  Nustep for 8 min; work level 3 to increase functional mobility and tolerance with activity  Hamstring/Hip Flexor stair stretches 30\" x 2 amari, each  Bridge with ball squeeze 2x15  SLR BL 2x15 with active quad set  RTB SL Clamshell x15  SL Hip Flexion Bent Knee: x  SL Hip Circles CW/CCW x10 BL  Supine DLS GTB Marches 2 x 10  Supine Piriformis Stretch BL 2x30s  Supine Birddog x10 BL  Partial squats 2 x 10  Hooklying GTB hip abduction 2 x 10  Prone piriiformis isometrics with ball squeeze between ankles 3\" hold x 10  Hip hikes off 2\" box x 10 amari    OP EDUCATION:   V/c for correct technique and posture with exercises.        "

## 2023-11-13 ENCOUNTER — APPOINTMENT (OUTPATIENT)
Dept: PHYSICAL THERAPY | Facility: CLINIC | Age: 63
End: 2023-11-13
Payer: COMMERCIAL

## 2023-11-16 ENCOUNTER — TREATMENT (OUTPATIENT)
Dept: PHYSICAL THERAPY | Facility: CLINIC | Age: 63
End: 2023-11-16
Payer: COMMERCIAL

## 2023-11-16 DIAGNOSIS — M54.16 LUMBAR RADICULOPATHY: ICD-10-CM

## 2023-11-16 PROCEDURE — 97110 THERAPEUTIC EXERCISES: CPT | Mod: GP,CQ

## 2023-11-16 ASSESSMENT — PAIN - FUNCTIONAL ASSESSMENT: PAIN_FUNCTIONAL_ASSESSMENT: 0-10

## 2023-11-16 ASSESSMENT — PAIN SCALES - GENERAL: PAINLEVEL_OUTOF10: 2

## 2023-11-16 NOTE — PROGRESS NOTES
"Physical Therapy    Physical Therapy Treatment    Patient Name: Maryann Brennan  MRN: [id number]  Today's Date: 11/16/2023  Time in/out: 11:30-12:15  Visit 4         Assessment:  Pt experienced dec in radicular pain w/ prone exs today. Pt had inc pain down L LE w/ piriformis stretch but was able to modify to reduce symptoms. Pt required verbal cues for breathing w/ exs     Plan:  OP PT Plan  Treatment/Interventions: Cryotherapy, Education/ Instruction, Gait training, Manual therapy, Neuromuscular re-education, Self care/ home management, Therapeutic activities, Therapeutic exercises  PT Plan: Skilled PT (Start on HEP for LE Proximal Hip Strengthening; Nustep warm up; Review stretches for piriformis)  PT Frequency: Other (Comment) (Initially 2x/week then progress to 1x/week)  Duration: 4 weeks  Onset Date: 10/12/23  Rehab Potential: Good  Plan of Care Agreement: Patient    Visit #4  Insurance Reviewed (per information provided by  pre-cert team)  Authorization required:  No   Preferred Name:  Maryann Brand    Current Problem  1. Lumbar radiculopathy  Follow Up In Physical Therapy          General     General  Reason for Referral: L Hip/Glute Radiculopathy  Referred By: Peter Luevano MD  Past Medical History Relevant to Rehab: Anemia, Psoriatic Arthritis, Wrist Fx R 1998; Wrist Fx Left 2022 (Plate)    Subjective    Pt states L hip  area is sore. Pt states pain goes down leg at times and gets tightness on inside of L thigh.    Precautions  Precautions  Medical Precautions: No known precautions/limitation       Pain  Pain Assessment  Pain Assessment: 0-10  Pain Score: 2  Pain Type: Chronic pain  Pain Location: Hip  Pain Orientation: Left    Objective   Added prone prop and press ups per log.      Treatments:   Therapeutic Exercise Performed: Yes  Nustep for 8 min; work level 3 to increase functional mobility and tolerance with activity  Hamstring/Hip Flexor stair stretches 30\"x3 amari, each  Bridge with ball squeeze 2x15  SLR " "BL 2x10 with active quad set  SL Clamshell x15  SL Hip Flexion Bent Knee:   SL Hip Circles CW/CCW x10 BL  Supine DLS GTB Marches 2 x 10  Supine Piriformis Stretch BL 30\"x3  Supine Birddog   Partial squats   Hooklying GTB hip abduction 2 x 10  Prone piriiformis isometrics with ball squeeze between ankles 3\" hold x 10  Hip hikes off 2\" box  Prone prop x 2 min  Prone press ups x10                               "

## 2023-11-20 ENCOUNTER — TREATMENT (OUTPATIENT)
Dept: PHYSICAL THERAPY | Facility: CLINIC | Age: 63
End: 2023-11-20
Payer: COMMERCIAL

## 2023-11-20 DIAGNOSIS — M54.16 LUMBAR RADICULOPATHY: ICD-10-CM

## 2023-11-20 PROCEDURE — 97110 THERAPEUTIC EXERCISES: CPT | Mod: GP

## 2023-11-20 PROCEDURE — 97112 NEUROMUSCULAR REEDUCATION: CPT | Mod: GP

## 2023-11-20 ASSESSMENT — PAIN SCALES - GENERAL: PAINLEVEL_OUTOF10: 2

## 2023-11-20 ASSESSMENT — PAIN - FUNCTIONAL ASSESSMENT: PAIN_FUNCTIONAL_ASSESSMENT: 0-10

## 2023-11-20 NOTE — PROGRESS NOTES
Physical Therapy    Physical Therapy Progress Note    Patient Name: Maryann Brennan  MRN: [id number]  Today's Date: 11/20/2023  Time Calculation  Start Time: 1148  Stop Time: 1230  Time Calculation (min): 42 min  Billing:  Treatment:   Therapeutic Exercise:  30  NMR:  10    Assessment:  PT Assessment  PT Assessment Results: Decreased strength, Decreased range of motion, Decreased mobility, Pain  Evaluation/Treatment Tolerance: Patient tolerated treatment well  pt tolerated treatment well, did well with progression of hip strengthening. pt needs continued work on increasing tolerance with activity in the proximal hip muscles. pt left session with all questions answered.     Plan:  OP PT Plan  Treatment/Interventions: Cryotherapy, Education/ Instruction, Gait training, Manual therapy, Neuromuscular re-education, Self care/ home management, Therapeutic activities, Therapeutic exercises  PT Plan: Skilled PT, Other (Comment) (Continue with proximal hip strengthening - blaze pods; slider drills)  PT Frequency: Other (Comment) (initially 2x/week then progress to 1/week)  Duration: 4 weeks  Onset Date: 10/12/23  Rehab Potential: Good  Plan of Care Agreement: Patient  Visit #5  Insurance Reviewed (per information provided by  pre-cert team)  Authorization required:  No   Preferred Name:  Maryann Brand    Current Problem  1. Lumbar radiculopathy  Follow Up In Physical Therapy          General  PT  Visit  PT Received On: 11/20/23  Response to Previous Treatment: Patient with no complaints from previous session.  General  Reason for Referral: L Hip/Glute Radiculopathy  Referred By: Peter Luevano MD  Past Medical History Relevant to Rehab: Anemia, Psoriatic Arthritis, Wrist Fx R 1998; Wrist Fx Left 2022 (Plate)  Preferred Learning Style: verbal, visual, written  General Comment: Per pt report she thinks she has been pushign her stretches too hard; starting to feel pain in L knee again. She states she was standing alot this weekend.  She has been doing the cobra and the sphinx stretches which has been helping. She switched the exercises to the morning and it seemed better.    Subjective    Precautions  Precautions  Medical Precautions: No known precautions/limitation  Precautions Comment: Low Fall Risk  Pain  Pain Assessment  Pain Assessment: 0-10  Pain Score: 2  Pain Type: Chronic pain  Pain Location: Hip  Pain Orientation: Left    Objective   Posture  Postural Control  Posture Comment: Rounded shoulders; increased thoracic kyphosis    Balance: Good with slider drills - no need for UE assist     Gait: No antalgic gait    Treatments:  Therapeutic Exercise  Therapeutic Exercise Performed: Yes  Therapeutic Exercise Activity 1: Nustep for 8 min; work level 3 to increase functional mobility and tolerance with activity  Therapeutic Exercise Activity 2: Bridge with ball squeeze 2x15  Therapeutic Exercise Activity 3: SLR BL 2x10 with active quad set  Therapeutic Exercise Activity 4: SL Clamshell 2x10 BL RTB  Therapeutic Exercise Activity 7: Hip Hikes 3x10 BL 4in step  Therapeutic Exercise Activity 8: Supine Piriformis Stretch BL 2x30s  Therapeutic Exercise Activity 10: HS Stair Stretch 2x30s  Therapeutic Exercise Activity 11: Quad Stretch Stairs 2x30s    Balance/Neuromuscular Re-Education  Balance/Neuromuscular Re-Education Activity Performed: Yes  Balance/Neuromuscular Re-Education Activity 1: Slider Drills: Fwd, Bwd, Lateral x15 each  Balance/Neuromuscular Re-Education Activity 2: Bridges on Bosu ball 2x10    OP EDUCATION:  Outpatient Education  Individual(s) Educated: Patient  Education Provided: Anatomy, Body Mechanics, Fall Risk, Home Exercise Program, Home Safety, POC, Physiology, Posture  Risk and Benefits Discussed with Patient/Caregiver/Other: yes  Patient/Caregiver Demonstrated Understanding: yes  Plan of Care Discussed and Agreed Upon: yes  Patient Response to Education: Patient/Caregiver Verbalized Understanding of Information,  Patient/Caregiver Performed Return Demonstration of Exercises/Activities, Patient/Caregiver Asked Appropriate Questions

## 2023-11-27 ENCOUNTER — TREATMENT (OUTPATIENT)
Dept: PHYSICAL THERAPY | Facility: CLINIC | Age: 63
End: 2023-11-27
Payer: COMMERCIAL

## 2023-11-27 DIAGNOSIS — M54.16 LUMBAR RADICULOPATHY: Primary | ICD-10-CM

## 2023-11-27 PROCEDURE — 97112 NEUROMUSCULAR REEDUCATION: CPT | Mod: GP

## 2023-11-27 PROCEDURE — 97110 THERAPEUTIC EXERCISES: CPT | Mod: GP

## 2023-11-27 ASSESSMENT — PAIN SCALES - GENERAL: PAINLEVEL_OUTOF10: 1

## 2023-11-27 ASSESSMENT — PAIN - FUNCTIONAL ASSESSMENT: PAIN_FUNCTIONAL_ASSESSMENT: 0-10

## 2023-11-27 NOTE — PROGRESS NOTES
Physical Therapy    Physical Therapy Progress Note    Patient Name: Maryann Brennan  MRN: [id number]  Today's Date: 11/27/2023  Time Calculation  Start Time: 1420  Stop Time: 1500  Time Calculation (min): 40 min  Billing:  Treatment:   Therapeutic Exercise:  15  NMR:  25    Assessment:  PT Assessment  PT Assessment Results: Decreased strength, Decreased range of motion, Decreased mobility, Pain  Evaluation/Treatment Tolerance: Patient tolerated treatment well  pt tolerated treatment well, did well with progression of dynamic strengthening and balance with blaze pods. pt needs continued work on strengthening and increasing stability in the proximal hip musculature . pt left session with all questions answered.     Plan:  OP PT Plan  Treatment/Interventions: Cryotherapy, Education/ Instruction, Gait training, Manual therapy, Neuromuscular re-education, Self care/ home management, Therapeutic activities, Therapeutic exercises  PT Plan: Skilled PT, Other (Comment) (Re-Check Next Visit)  PT Frequency: Other (Comment) (initially 2x/week then progress to 1/week)  Duration: 4 weeks  Onset Date: 10/12/23  Rehab Potential: Good  Plan of Care Agreement: Patient  Visit #6  Insurance Reviewed (per information provided by  pre-cert team)  Authorization required:  No   Preferred Name:  Maryann Brand    Current Problem  1. Lumbar radiculopathy  Follow Up In Physical Therapy        General  PT  Visit  PT Received On: 11/27/23  Response to Previous Treatment: Patient with no complaints from previous session.  General  Reason for Referral: L Hip/Glute Radiculopathy  Referred By: Peter Luevano MD  Past Medical History Relevant to Rehab: Anemia, Psoriatic Arthritis, Wrist Fx R 1998; Wrist Fx Left 2022 (Plate)  Preferred Learning Style: verbal, visual, written  General Comment: She states she feels that her flexibilioty is improving and her movement is improcing as well - she still is getting knee pain at night but it is not as bad. Reports  last night she laid down and everything felt fine and then she woke up an hour later with throbbing. Reports her knot in her glute feels relaxed and not as bad as the beginnning of the eval    Subjective    Precautions  Precautions  Medical Precautions: No known precautions/limitation  Precautions Comment: Low Fall Risk  Pain  Pain Assessment  Pain Assessment: 0-10  Pain Score: 1  Pain Type: Chronic pain  Pain Location: Hip  Pain Orientation: Left    Objective   Posture  Postural Control  Posture Comment: Rounded shoulders; increased thoracic kyphosis    Gait: No antalgic gait; good jacqueline     Treatments:  Therapeutic Exercise  Therapeutic Exercise Performed: Yes  Therapeutic Exercise Activity 1: Nustep for 8 min; work level 5 to increase functional mobility and tolerance with activity  Therapeutic Exercise Activity 7: Hip Hikes 2x15 BL 6in step  Therapeutic Exercise Activity 8: Supine Piriformis Stretch BL 2x30s    Balance/Neuromuscular Re-Education  Balance/Neuromuscular Re-Education Activity Performed: Yes  Balance/Neuromuscular Re-Education Activity 1: Slider Drills: Fwd, Bwd, Lateral 2x15 each  Balance/Neuromuscular Re-Education Activity 2: Bridges on Bosu ball 2x15  Balance/Neuromuscular Re-Education Activity 3: Blaze Pods: Triangle formation: Fwd Lunge taps: R LE; 3x30s 15s break  Balance/Neuromuscular Re-Education Activity 4: Blaze Pods: Triangle formation: Fwd Lunge taps: L LE; 3x30s 15s break    OP EDUCATION:  Outpatient Education  Individual(s) Educated: Patient  Education Provided: Anatomy, Body Mechanics, Fall Risk, Home Exercise Program, Home Safety, POC, Physiology, Posture  Risk and Benefits Discussed with Patient/Caregiver/Other: yes  Patient/Caregiver Demonstrated Understanding: yes  Plan of Care Discussed and Agreed Upon: yes  Patient Response to Education: Patient/Caregiver Verbalized Understanding of Information, Patient/Caregiver Performed Return Demonstration of Exercises/Activities,  Patient/Caregiver Asked Appropriate Questions

## 2023-11-30 ENCOUNTER — TREATMENT (OUTPATIENT)
Dept: PHYSICAL THERAPY | Facility: CLINIC | Age: 63
End: 2023-11-30
Payer: COMMERCIAL

## 2023-11-30 DIAGNOSIS — M54.16 LUMBAR RADICULOPATHY: ICD-10-CM

## 2023-11-30 PROCEDURE — 97530 THERAPEUTIC ACTIVITIES: CPT | Mod: GP

## 2023-11-30 PROCEDURE — 97110 THERAPEUTIC EXERCISES: CPT | Mod: GP

## 2023-11-30 ASSESSMENT — PAIN DESCRIPTION - DESCRIPTORS: DESCRIPTORS: TIGHTNESS

## 2023-11-30 ASSESSMENT — PAIN - FUNCTIONAL ASSESSMENT: PAIN_FUNCTIONAL_ASSESSMENT: 0-10

## 2023-11-30 ASSESSMENT — PAIN SCALES - GENERAL: PAINLEVEL_OUTOF10: 1

## 2023-11-30 NOTE — PROGRESS NOTES
Physical Therapy    Physical Therapy Discharge & Re-Assessment    Patient Name: Maryann Brennan  MRN: [id number]  Today's Date: 11/30/2023  Time Calculation  Start Time: 1136  Stop Time: 1210  Time Calculation (min): 34 min  Billing:  Treatment:   Therapeutic Exercise:  8   Therapeutic Activity:  20    Assessment:  PT Assessment  Evaluation/Treatment Tolerance: Patient tolerated treatment well  Assessment Comment: DISCHARGE  Since initial evaluation, pt has met ALL therapeutic goals. pt is discharged from skilled PT due to meeting all goals and returning to functional baseline. pt verbally agrees to DC at this time. pt left session with all questions answered.     Plan: DISCHARGE  OP PT Plan  PT Plan: No Additional PT interventions required at this time  Visit #7  Insurance Reviewed (per information provided by  pre-cert team)  Authorization required:  No   Preferred Name:  Maryann Brand    Current Problem  1. Lumbar radiculopathy  Follow Up In Physical Therapy          General  PT  Visit  PT Received On: 11/30/23  Response to Previous Treatment: Patient with no complaints from previous session.  General  Reason for Referral: L Hip/Glute Radiculopathy  Referred By: Peter Luevano MD  Past Medical History Relevant to Rehab: Anemia, Psoriatic Arthritis, Wrist Fx R 1998; Wrist Fx Left 2022 (Plate)  Preferred Learning Style: verbal, visual, written  General Comment: States she thinks everything is going well.    Subjective    Precautions  Precautions  Medical Precautions: No known precautions/limitation  Precautions Comment: Low Fall Risk    Pain  Pain Assessment  Pain Assessment: 0-10  Pain Score: 1  Pain Type: Chronic pain  Pain Location: Hip  Pain Orientation: Left  Pain Descriptors: Tightness  Pain Frequency: Intermittent    Objective   Posture  Postural Control  Posture Comment: Rounded shoulders; increased thoracic kyphosis    Lumbar Spine  Lumbar Palpation/Joint Mobility Assessment  Palpation/Joint Mobility Comment:  No pain with palpation to the L/S and Sacrum  Lumbar AROM  Lumbar AROM WFL: yes  Lumbar flexion: (60°): WFL  Lumbar extension (25°): WFL;  Lumbar rotation right (30°): WFL  Lumbar rotation left (30°): WFL  Lumbar sidebend right (25°): WFL  Lumbar sidebend left (25°): WFL  Hip AROM  R Hip Flexion (125°): 125  L Hip Flexion (125°): 125  R Hip Abduction (45°): 45  L Hip Abduction  (45°): WNL, no pain  R Hip Extension (10°): 10  L Hip Extension (10°): 10  R hip ER: (45°): 45  L hip ER: (45°): 45  R hip IR: (45°): 45  L hip IR: (45°): 45, tightness in the L quad  Lumbar Myotomes  R Hip Flex : 4+/5  L Hip Flex (L2): (5/5): 4+/5  R Knee Ext (L3): (5/5): 5/5  L Knee Ext (L3): (5/5) : 4/5  R Ankle DF (L4): (5/5): 5/5  L Ankle DF (L4): (5/5): 5/5  R Great Toe Ext (L5): (5/5) : 5/5  L Great Toe Ext (L5): (5/5): 5/5  R Ankle EV (S1): (5/5): 5/5  L Ankle EV (S1): (5/5): 5/5  Specific Lower Extremity MMT  R Gluteals (prone): (5/5): 5/5  L Gluteals (prone): (5/5): 5/5  R Hip External Rotation: (5/5): 5/5  L Hip External Rotation: (5/5): 5/5  Dermatomes  Dermatomes WFL: yes  R Anterior / Medial thigh ( L2, L3): WFL  L Anterior / Medial thigh ( L2, L3): WFL  R Medial ankle (L4): WFL  L Medial ankle (L4): WFL  R Lateral calf/dorsal/plantar surface of foot, digit 1-4 (L5): WFL  L Lateral calf/dorsal/plantar surface of foot, digit 1-4 (L5): WFL  R Digit 5 (S1): WFL  L Digit 5 (S1): WFL  Special Tests  Supine SLR: (Negative): (  Slump: (Negative): Slump (-) BL   Outcome Measures:  Other Measures  5x Sit to Stand: 8.87s with no UE assist  Oswestry Disablity Index (RAFAEL): 1/50  Treatments:  Therapeutic Exercise  Therapeutic Exercise Performed: Yes  Therapeutic Exercise Activity 1: Nustep for 8 min; work level 5 to increase functional mobility and tolerance with activity    Therapeutic Activity  Therapeutic Activity Performed: Yes  Therapeutic Activity 1: Re-Assessment & DC  1. Repeated sit to/from stands from standardized chair height.  Required VC for no use of hands, nose over toes, full upright stand from chair, use of anterior shift with fwd momentum and eccentric control into chair.  2. Functional Performance via stairs: Ascends/Descends 4 6in steps at close supervision with no assistive device, no use of HR   3. Functional mobility via AROM/PROM of LE; Verbal cues for sequencing/positioning.  4. Functional Performance via MMT of LE: Hip, knee, ankle; Verbal cues for sequencing/positioning.   5. Educated pt on MET POC, DISCHARGE goals of physical therapy, as well as the benefits in continue to participate in exercises to increase functional mobility and maximize pt safety.     OP EDUCATION:  Outpatient Education  Individual(s) Educated: Patient  Education Provided: Anatomy, Body Mechanics, Fall Risk, Home Exercise Program, Home Safety, POC, Physiology, Posture  Risk and Benefits Discussed with Patient/Caregiver/Other: yes  Patient/Caregiver Demonstrated Understanding: yes  Plan of Care Discussed and Agreed Upon: yes  Patient Response to Education: Patient/Caregiver Verbalized Understanding of Information, Patient/Caregiver Performed Return Demonstration of Exercises/Activities, Patient/Caregiver Asked Appropriate Questions    Goals: DISCHARGE 11/30/23  STG: pt will be independent in HEP & symptom management (MET)      LTGs:  Pain: pt will demonstrate a 2 pt improvement pain on the VAS scale, allowing for improved tolerance to perform daily functional activities. (MET)      ROM: pt will demonstrate no losses in PROM/AROM in the Hip without onset of pain, allowing for a normal gait pattern. (MET)      Strength: Pt will improve B LE Strength to >/= 4+/5 to increase functional performance, tolerance to activity, and enhancing pt safety to particpate in ADLs and IADLs. (MET)      Gait: pt will demonstrate normal mechanics without pain during gait and performance of stairs, allowing for pt to return to navigating the community. (MET)      5xSTS: pt  will perform 5x sit to  < 15 seconds to decrease fall risk. OR MCID 2.3s Baseline 11.49sec (10/31/23) --> 8.87s (11/30/23)      Stairs: pt will ascend/descend step over step (6in step) with proper gait mechanics and use of <1HR to promote functional mobility and improve functional performance. (NT)

## 2023-12-18 DIAGNOSIS — L40.50 PSORIATIC ARTHRITIS (MULTI): Primary | ICD-10-CM

## 2023-12-18 RX ORDER — ADALIMUMAB 40MG/0.4ML
40 KIT SUBCUTANEOUS
Qty: 6 EACH | Refills: 3 | Status: SHIPPED | OUTPATIENT
Start: 2023-12-18 | End: 2023-12-20 | Stop reason: SDUPTHER

## 2023-12-20 DIAGNOSIS — L40.50 PSORIATIC ARTHRITIS (MULTI): ICD-10-CM

## 2023-12-20 RX ORDER — ADALIMUMAB 40MG/0.4ML
40 KIT SUBCUTANEOUS
Qty: 6 EACH | Refills: 3 | Status: SHIPPED | OUTPATIENT
Start: 2023-12-20

## 2023-12-20 RX ORDER — ADALIMUMAB 40MG/0.4ML
40 KIT SUBCUTANEOUS
Qty: 6 EACH | Refills: 3 | Status: SHIPPED | OUTPATIENT
Start: 2023-12-20 | End: 2023-12-20 | Stop reason: SDUPTHER

## 2023-12-26 ENCOUNTER — TELEPHONE (OUTPATIENT)
Dept: PRIMARY CARE | Facility: CLINIC | Age: 63
End: 2023-12-26
Payer: COMMERCIAL

## 2023-12-26 NOTE — TELEPHONE ENCOUNTER
Patient stopped in office today - states she received a message from An[facility] re; alicia for her refill on Humira. States they need additional information before they can refill the medication.     Please advise

## 2023-12-27 ENCOUNTER — SPECIALTY PHARMACY (OUTPATIENT)
Dept: PHARMACY | Facility: CLINIC | Age: 63
End: 2023-12-27

## 2024-01-06 DIAGNOSIS — L40.50 PSORIATIC ARTHRITIS (MULTI): ICD-10-CM

## 2024-01-08 DIAGNOSIS — L40.50 PSORIATIC ARTHRITIS (MULTI): Primary | ICD-10-CM

## 2024-01-08 RX ORDER — METHOTREXATE 2.5 MG/1
TABLET ORAL
Qty: 24 TABLET | Refills: 2 | Status: SHIPPED | OUTPATIENT
Start: 2024-01-08 | End: 2024-03-26

## 2024-01-23 ENCOUNTER — LAB (OUTPATIENT)
Dept: LAB | Facility: LAB | Age: 64
End: 2024-01-23
Payer: COMMERCIAL

## 2024-01-23 ENCOUNTER — OFFICE VISIT (OUTPATIENT)
Dept: RHEUMATOLOGY | Facility: CLINIC | Age: 64
End: 2024-01-23
Payer: COMMERCIAL

## 2024-01-23 VITALS
HEIGHT: 64 IN | BODY MASS INDEX: 24.01 KG/M2 | WEIGHT: 140.6 LBS | DIASTOLIC BLOOD PRESSURE: 78 MMHG | HEART RATE: 43 BPM | TEMPERATURE: 97.6 F | OXYGEN SATURATION: 99 % | SYSTOLIC BLOOD PRESSURE: 120 MMHG

## 2024-01-23 DIAGNOSIS — L40.50 PSORIATIC ARTHRITIS (MULTI): ICD-10-CM

## 2024-01-23 DIAGNOSIS — L40.50 PSORIATIC ARTHROPATHY (MULTI): Primary | ICD-10-CM

## 2024-01-23 LAB
ALBUMIN SERPL BCP-MCNC: 4.3 G/DL (ref 3.4–5)
ALP SERPL-CCNC: 54 U/L (ref 33–136)
ALT SERPL W P-5'-P-CCNC: 16 U/L (ref 7–45)
ANION GAP SERPL CALC-SCNC: 9 MMOL/L (ref 10–20)
AST SERPL W P-5'-P-CCNC: 20 U/L (ref 9–39)
BASOPHILS # BLD AUTO: 0.03 X10*3/UL (ref 0–0.1)
BASOPHILS NFR BLD AUTO: 0.6 %
BILIRUB SERPL-MCNC: 0.5 MG/DL (ref 0–1.2)
BUN SERPL-MCNC: 16 MG/DL (ref 6–23)
CALCIUM SERPL-MCNC: 9.5 MG/DL (ref 8.6–10.3)
CHLORIDE SERPL-SCNC: 101 MMOL/L (ref 98–107)
CO2 SERPL-SCNC: 31 MMOL/L (ref 21–32)
CREAT SERPL-MCNC: 0.77 MG/DL (ref 0.5–1.05)
CRP SERPL-MCNC: 0.11 MG/DL
EGFRCR SERPLBLD CKD-EPI 2021: 88 ML/MIN/1.73M*2
EOSINOPHIL # BLD AUTO: 0.05 X10*3/UL (ref 0–0.7)
EOSINOPHIL NFR BLD AUTO: 0.9 %
ERYTHROCYTE [DISTWIDTH] IN BLOOD BY AUTOMATED COUNT: 13.3 % (ref 11.5–14.5)
GLUCOSE SERPL-MCNC: 96 MG/DL (ref 74–99)
HCT VFR BLD AUTO: 40.9 % (ref 36–46)
HGB BLD-MCNC: 13.4 G/DL (ref 12–16)
IMM GRANULOCYTES # BLD AUTO: 0.01 X10*3/UL (ref 0–0.7)
IMM GRANULOCYTES NFR BLD AUTO: 0.2 % (ref 0–0.9)
LYMPHOCYTES # BLD AUTO: 2.11 X10*3/UL (ref 1.2–4.8)
LYMPHOCYTES NFR BLD AUTO: 39.9 %
MCH RBC QN AUTO: 30.3 PG (ref 26–34)
MCHC RBC AUTO-ENTMCNC: 32.8 G/DL (ref 32–36)
MCV RBC AUTO: 93 FL (ref 80–100)
MONOCYTES # BLD AUTO: 0.73 X10*3/UL (ref 0.1–1)
MONOCYTES NFR BLD AUTO: 13.8 %
NEUTROPHILS # BLD AUTO: 2.36 X10*3/UL (ref 1.2–7.7)
NEUTROPHILS NFR BLD AUTO: 44.6 %
NRBC BLD-RTO: 0 /100 WBCS (ref 0–0)
PLATELET # BLD AUTO: 209 X10*3/UL (ref 150–450)
POTASSIUM SERPL-SCNC: 4.2 MMOL/L (ref 3.5–5.3)
PROT SERPL-MCNC: 7.3 G/DL (ref 6.4–8.2)
RBC # BLD AUTO: 4.42 X10*6/UL (ref 4–5.2)
SODIUM SERPL-SCNC: 137 MMOL/L (ref 136–145)
WBC # BLD AUTO: 5.3 X10*3/UL (ref 4.4–11.3)

## 2024-01-23 PROCEDURE — 85025 COMPLETE CBC W/AUTO DIFF WBC: CPT

## 2024-01-23 PROCEDURE — 86140 C-REACTIVE PROTEIN: CPT

## 2024-01-23 PROCEDURE — 36415 COLL VENOUS BLD VENIPUNCTURE: CPT

## 2024-01-23 PROCEDURE — 80053 COMPREHEN METABOLIC PANEL: CPT

## 2024-01-23 PROCEDURE — 3008F BODY MASS INDEX DOCD: CPT | Performed by: INTERNAL MEDICINE

## 2024-01-23 PROCEDURE — 99214 OFFICE O/P EST MOD 30 MIN: CPT | Performed by: INTERNAL MEDICINE

## 2024-01-23 PROCEDURE — 1036F TOBACCO NON-USER: CPT | Performed by: INTERNAL MEDICINE

## 2024-01-23 ASSESSMENT — PATIENT HEALTH QUESTIONNAIRE - PHQ9
1. LITTLE INTEREST OR PLEASURE IN DOING THINGS: NOT AT ALL
2. FEELING DOWN, DEPRESSED OR HOPELESS: NOT AT ALL
SUM OF ALL RESPONSES TO PHQ9 QUESTIONS 1 AND 2: 0

## 2024-01-23 ASSESSMENT — ENCOUNTER SYMPTOMS
DEPRESSION: 0
OCCASIONAL FEELINGS OF UNSTEADINESS: 0
LOSS OF SENSATION IN FEET: 0

## 2024-01-23 NOTE — PROGRESS NOTES
Subjective   Patient ID: Maryann Brennan is a 63 y.o. female who presents for Follow-up.    HPI  62 yo F here for follow-up regarding psoriatic arthritis. Her psoriasis started in her scalp in 2005.   Her joint pain started in 2011.      She remains on methotrexate to 15 mg orally weekly (dose reduced 11/17 because WBC 2.6) , folic acid 1 mg daily, and Humira 40 mg subcutaneous every 2 weeks.      She is overall doing well. She is not having much morning stiffness. Skin is doing well.     She did fall on ice 1/22, suffered a fracture of her distal left radius which required surgery 2/22.     DEXA May 2022: T score -2.2 left femoral neck (Z score -1.0, decline of 8%), T score -2.2 left total hip (Z score -1.3, decline of 8.9%, T score -2.3 lumbar spine (Z score -1.1, decline of 4%).  Estimated 10-year fracture risk by FRAX is 17.3% for major osteoporotic fracture and 2.8% for hip fracture.     She does take calcium 500 mg daily and vitamin D 4000 IU daily.     Past history of treatment:   MTX started 2011 , increased to 20 mg weekly 6/16.   Enbrel- had secondary failure after 1 1/2 years.   Humira started 5/15.      x-rays of her hands done July 2019. The left index finger PIP joint shows soft tissue swelling, perhaps mild joint space narrowing but no erosions.     Labs January 2023: CBC normal, CMP normal, CRP 0.11 (less than 1)  Labs April 2023: CBC normal except white blood cell count 4.3, CMP normal, CRP less than 0.1 (less than 1)  Labs July 2023: CBC normal, CMP normal except random glucose 100, CRP 0.1 (normal less than 1)  Labs August 2023: Cholesterol 206, HDL 86, , triglycerides 59  Labs 10/23: CMP normal, CBC normal, CRP 0.19 (less than 1)     Health maintenance:   Prevnar-13  7/21, pneumovax 1/23   Pfizer COVID-19 vaccine March 15, 2021, April 5, 2021, booster 11/06/21, BV Pfizer booster 11/22  Pfizer COVID vaccine 10/23   Flu shot 9/15/23   Tdap 5/17/21      ROS:  General: Denies fevers or  "chills.  CV: Denies chest pain or palpitations.  Denies leg edema.  Lungs: Denies coughing or shortness of breath.  Skin: Denies rashes or nodules.  MS: Denies joint pain or joint swelling.        Objective   Ht 1.626 m (5' 4\")   BMI 24.55 kg/m²   Visit Vitals  /88 (BP Location: Left arm, Patient Position: Sitting, BP Cuff Size: Small adult)   Pulse (!) 43   Temp 36.4 °C (97.6 °F)   Ht 1.626 m (5' 4\")   Wt 63.8 kg (140 lb 9.6 oz)   SpO2 99%   BMI 24.13 kg/m²   Smoking Status Never   BSA 1.7 m²      Problem List Items Addressed This Visit             ICD-10-CM    Psoriatic arthropathy (CMS/HCC) - Primary L40.50    BMI 24.0-24.9, adult Z68.24   .    Physical Exam  HEENT: PERRL, EOMI  Neck: Supple, no nodes.  CV: RRR, no MGR.  Lungs: Clear, no rales or wheezes.  Abdomen: Soft, nontender. No hepatosplenomegaly.  Extremities:  No cyanosis, clubbing, or edema.  MS: No synovitis.          Mild bony prominence of right third and fifth PIP joints, left second and fifth PIP joints.  She lacks a few degrees of extension of both elbows, right greater than left.  Skin: No rashes or nodules.  No psoriatic plaques seen today.            Assessment/Plan   Problem List Items Addressed This Visit             ICD-10-CM    Psoriatic arthropathy (CMS/HCC) - Primary L40.50    BMI 24.0-24.9, adult Z68.24       1. Psoriatic arthritis- currently doing well.      2. Left distal radius fracture 1/22- required surgery 2/22.    DEXA May 2022 shows T score -2.2 left total hip, T score -2.2 left femoral neck, T score -2.3 lumbar spine.   estimated 10-year fracture risk by FRAX is 17.3% for major osteoporotic fracture and 2.8% for hip fracture.   Continue to try to get calcium 1200 mg daily and vitamin D 2000 IU daily.   DEXA will be repeated May 2024.    3. BMI 24- stable.    Plan:  Continue same medications.  Check CBC with differential, CMP, CRP.  Follow-up in 3 months.  Phone sooner if needed.            "

## 2024-03-26 DIAGNOSIS — L40.50 PSORIATIC ARTHRITIS (MULTI): ICD-10-CM

## 2024-03-26 RX ORDER — METHOTREXATE 2.5 MG/1
TABLET ORAL
Qty: 24 TABLET | Refills: 2 | Status: SHIPPED | OUTPATIENT
Start: 2024-03-26

## 2024-04-02 ENCOUNTER — OFFICE VISIT (OUTPATIENT)
Dept: PRIMARY CARE | Facility: CLINIC | Age: 64
End: 2024-04-02
Payer: COMMERCIAL

## 2024-04-02 ENCOUNTER — HOSPITAL ENCOUNTER (OUTPATIENT)
Dept: RADIOLOGY | Facility: CLINIC | Age: 64
Discharge: HOME | End: 2024-04-02
Payer: COMMERCIAL

## 2024-04-02 VITALS
DIASTOLIC BLOOD PRESSURE: 79 MMHG | WEIGHT: 142 LBS | HEART RATE: 64 BPM | BODY MASS INDEX: 24.24 KG/M2 | HEIGHT: 64 IN | TEMPERATURE: 97.7 F | SYSTOLIC BLOOD PRESSURE: 121 MMHG | OXYGEN SATURATION: 98 %

## 2024-04-02 DIAGNOSIS — M54.16 LUMBAR RADICULOPATHY: ICD-10-CM

## 2024-04-02 DIAGNOSIS — M25.552 LEFT HIP PAIN: ICD-10-CM

## 2024-04-02 DIAGNOSIS — M54.16 LUMBAR RADICULOPATHY: Primary | ICD-10-CM

## 2024-04-02 PROCEDURE — 1036F TOBACCO NON-USER: CPT | Performed by: INTERNAL MEDICINE

## 2024-04-02 PROCEDURE — 72110 X-RAY EXAM L-2 SPINE 4/>VWS: CPT | Performed by: RADIOLOGY

## 2024-04-02 PROCEDURE — 3008F BODY MASS INDEX DOCD: CPT | Performed by: INTERNAL MEDICINE

## 2024-04-02 PROCEDURE — 72110 X-RAY EXAM L-2 SPINE 4/>VWS: CPT

## 2024-04-02 PROCEDURE — 99213 OFFICE O/P EST LOW 20 MIN: CPT | Performed by: INTERNAL MEDICINE

## 2024-04-02 PROCEDURE — 73502 X-RAY EXAM HIP UNI 2-3 VIEWS: CPT | Mod: LT

## 2024-04-02 PROCEDURE — 73502 X-RAY EXAM HIP UNI 2-3 VIEWS: CPT | Mod: LEFT SIDE | Performed by: RADIOLOGY

## 2024-04-02 ASSESSMENT — PATIENT HEALTH QUESTIONNAIRE - PHQ9
SUM OF ALL RESPONSES TO PHQ9 QUESTIONS 1 & 2: 0
2. FEELING DOWN, DEPRESSED OR HOPELESS: NOT AT ALL
1. LITTLE INTEREST OR PLEASURE IN DOING THINGS: NOT AT ALL

## 2024-04-02 ASSESSMENT — ENCOUNTER SYMPTOMS
WEAKNESS: 1
NUMBNESS: 0
TINGLING: 0
BOWEL INCONTINENCE: 0
FEVER: 0
PARESIS: 0
DYSURIA: 0
HEADACHES: 0
LEG PAIN: 1
PARESTHESIAS: 0
WEIGHT LOSS: 0
PERIANAL NUMBNESS: 0
ABDOMINAL PAIN: 0
BACK PAIN: 1

## 2024-04-02 NOTE — PROGRESS NOTES
Answers submitted by the patient for this visit:  Back Pain Questionnaire (Submitted on 4/2/2024)  Chief Complaint: Back pain  Chronicity: chronic  Onset: more than 1 month ago  Frequency: every several days  Progression since onset: waxing and waning  Pain location: gluteal, lumbar spine, sacro-iliac  Pain quality: aching, shooting  Radiates to: left knee, left thigh  Pain - numeric: 6/10  Pain is: worse during the night  Aggravated by: position, lying down  Stiffness is present: at night  abdominal pain: No  bladder incontinence: No  bowel incontinence: No  chest pain: No  dysuria: No  fever: No  headaches: No  leg pain: Yes  numbness: No  paresis: No  paresthesias: No  pelvic pain: No  perianal numbness: No  tingling: No  weakness: Yes  weight loss: No

## 2024-04-02 NOTE — PROGRESS NOTES
"Subjective   Patient ID: Maryann Brennan is a 63 y.o. female who presents for Follow-up.    HPI   After last visit, did PT for 6 weeks. Overall not much changed. Some bad days, some good days. Pain is L lumbar area, sometimes radiates to buttocks or down side of leg to the knee. Rarely pain goes below the knee.  Denies numbness, tingling, or weakness in the legs.  She has continued to do the PT exercises at home.  Occasionally takes ibuprofen which helps more than naproxen.    Review of Systems    Objective   /79   Pulse 64   Temp 36.5 °C (97.7 °F)   Ht 1.626 m (5' 4\")   Wt 64.4 kg (142 lb)   SpO2 98%   BMI 24.37 kg/m²     Physical Exam  Constitutional:       General: She is not in acute distress.  Musculoskeletal:      Cervical back: No tenderness.      Thoracic back: No tenderness.      Lumbar back: No tenderness.      Comments: Full ROM of L hip but some pain elicited in L groin with internal rotation.  Neurological:      Motor: No weakness.      Gait: Gait antalgic.     Assessment/Plan   Problem List Items Addressed This Visit             ICD-10-CM    Lumbar radiculopathy - Primary M54.16    Relevant Orders    XR lumbar spine complete 4+ views    Left hip pain M25.552    Relevant Orders    XR hip left with pelvis when performed 2 or 3 views          Chronic posterior L hip/low back and L leg pain - This is either radicular or originating in the hip. Will get xrays. If hip xray is ok, will get lumbar MRI. Use tylenol 1000mg BID in mean time for pain.  "

## 2024-04-07 DIAGNOSIS — M16.10 PRIMARY OSTEOARTHRITIS OF HIP, UNSPECIFIED LATERALITY: Primary | ICD-10-CM

## 2024-04-25 ENCOUNTER — OFFICE VISIT (OUTPATIENT)
Dept: RHEUMATOLOGY | Facility: CLINIC | Age: 64
End: 2024-04-25
Payer: COMMERCIAL

## 2024-04-25 ENCOUNTER — LAB (OUTPATIENT)
Dept: LAB | Facility: LAB | Age: 64
End: 2024-04-25
Payer: COMMERCIAL

## 2024-04-25 VITALS
HEART RATE: 61 BPM | HEIGHT: 64 IN | BODY MASS INDEX: 24.01 KG/M2 | DIASTOLIC BLOOD PRESSURE: 88 MMHG | TEMPERATURE: 97.3 F | SYSTOLIC BLOOD PRESSURE: 126 MMHG | OXYGEN SATURATION: 99 % | WEIGHT: 140.6 LBS

## 2024-04-25 DIAGNOSIS — Z78.0 POSTMENOPAUSAL ESTROGEN DEFICIENCY: ICD-10-CM

## 2024-04-25 DIAGNOSIS — L40.50 PSORIATIC ARTHROPATHY (MULTI): Primary | ICD-10-CM

## 2024-04-25 DIAGNOSIS — L40.50 PSORIATIC ARTHROPATHY (MULTI): ICD-10-CM

## 2024-04-25 PROBLEM — M16.12 PRIMARY OSTEOARTHRITIS OF LEFT HIP: Status: ACTIVE | Noted: 2024-04-17

## 2024-04-25 LAB
ALBUMIN SERPL BCP-MCNC: 4.4 G/DL (ref 3.4–5)
ALP SERPL-CCNC: 64 U/L (ref 33–136)
ALT SERPL W P-5'-P-CCNC: 18 U/L (ref 7–45)
ANION GAP SERPL CALC-SCNC: 10 MMOL/L (ref 10–20)
AST SERPL W P-5'-P-CCNC: 18 U/L (ref 9–39)
BASOPHILS # BLD AUTO: 0.03 X10*3/UL (ref 0–0.1)
BASOPHILS NFR BLD AUTO: 0.6 %
BILIRUB SERPL-MCNC: 0.6 MG/DL (ref 0–1.2)
BUN SERPL-MCNC: 17 MG/DL (ref 6–23)
CALCIUM SERPL-MCNC: 9.2 MG/DL (ref 8.6–10.6)
CHLORIDE SERPL-SCNC: 101 MMOL/L (ref 98–107)
CO2 SERPL-SCNC: 30 MMOL/L (ref 21–32)
CREAT SERPL-MCNC: 0.65 MG/DL (ref 0.5–1.05)
CRP SERPL-MCNC: <0.1 MG/DL
EGFRCR SERPLBLD CKD-EPI 2021: >90 ML/MIN/1.73M*2
EOSINOPHIL # BLD AUTO: 0.07 X10*3/UL (ref 0–0.7)
EOSINOPHIL NFR BLD AUTO: 1.3 %
ERYTHROCYTE [DISTWIDTH] IN BLOOD BY AUTOMATED COUNT: 13.5 % (ref 11.5–14.5)
GLUCOSE SERPL-MCNC: 88 MG/DL (ref 74–99)
HCT VFR BLD AUTO: 40.9 % (ref 36–46)
HGB BLD-MCNC: 13.8 G/DL (ref 12–16)
IMM GRANULOCYTES # BLD AUTO: 0.01 X10*3/UL (ref 0–0.7)
IMM GRANULOCYTES NFR BLD AUTO: 0.2 % (ref 0–0.9)
LYMPHOCYTES # BLD AUTO: 2.09 X10*3/UL (ref 1.2–4.8)
LYMPHOCYTES NFR BLD AUTO: 40 %
MCH RBC QN AUTO: 31 PG (ref 26–34)
MCHC RBC AUTO-ENTMCNC: 33.7 G/DL (ref 32–36)
MCV RBC AUTO: 92 FL (ref 80–100)
MONOCYTES # BLD AUTO: 0.73 X10*3/UL (ref 0.1–1)
MONOCYTES NFR BLD AUTO: 14 %
NEUTROPHILS # BLD AUTO: 2.3 X10*3/UL (ref 1.2–7.7)
NEUTROPHILS NFR BLD AUTO: 43.9 %
NRBC BLD-RTO: 0 /100 WBCS (ref 0–0)
PLATELET # BLD AUTO: 244 X10*3/UL (ref 150–450)
POTASSIUM SERPL-SCNC: 4.2 MMOL/L (ref 3.5–5.3)
PROT SERPL-MCNC: 7.1 G/DL (ref 6.4–8.2)
RBC # BLD AUTO: 4.45 X10*6/UL (ref 4–5.2)
SODIUM SERPL-SCNC: 137 MMOL/L (ref 136–145)
WBC # BLD AUTO: 5.2 X10*3/UL (ref 4.4–11.3)

## 2024-04-25 PROCEDURE — 85025 COMPLETE CBC W/AUTO DIFF WBC: CPT

## 2024-04-25 PROCEDURE — 86140 C-REACTIVE PROTEIN: CPT

## 2024-04-25 PROCEDURE — 36415 COLL VENOUS BLD VENIPUNCTURE: CPT

## 2024-04-25 PROCEDURE — 99214 OFFICE O/P EST MOD 30 MIN: CPT | Performed by: INTERNAL MEDICINE

## 2024-04-25 PROCEDURE — 80053 COMPREHEN METABOLIC PANEL: CPT

## 2024-04-25 PROCEDURE — 1036F TOBACCO NON-USER: CPT | Performed by: INTERNAL MEDICINE

## 2024-04-25 PROCEDURE — 3008F BODY MASS INDEX DOCD: CPT | Performed by: INTERNAL MEDICINE

## 2024-04-25 RX ORDER — NIACIN (INOSITOL NIACINATE) 400(500MG)
100 CAPSULE ORAL
COMMUNITY
End: 2024-04-25 | Stop reason: SDUPTHER

## 2024-04-25 NOTE — PROGRESS NOTES
Subjective   Patient ID: Maryann Brennan is a 63 y.o. female who presents for Follow-up regarding psoriatic arthritis.    HPI  62 yo F here for follow-up regarding psoriatic arthritis. Her psoriasis started in her scalp in 2005.   Her joint pain started in 2011.      She remains on methotrexate to 15 mg orally weekly (dose reduced 11/17 because WBC 2.6) , folic acid 1 mg daily, and Humira 40 mg subcutaneous every 2 weeks.      She is overall doing well. She is not having much morning stiffness. Skin is doing well.    She has been struggling with pain in the left buttock area for about 9 months.  She gets occasional left groin pain.  It sometimes radiates down the left thigh to the knee.  She saw Dr. Gunn in orthopedic surgery regarding this.  X-ray of the left hip done April 17, 2024 shows moderate OA, with joint space narrowing, sclerosis and osteophytes.  X-ray lumbar spine April 2, 2024 shows mild to space narrowing at L5-S1, and mild facet disease in the lower lumbar spine.    She had a corticosteroid injection of the left hip done about 1 week ago, which has helped slightly.  She is trying to delay hip replacement.     She did fall on ice 1/22, suffered a fracture of her distal left radius which required surgery 2/22.     DEXA May 2022: T score -2.2 left femoral neck (Z score -1.0, decline of 8%), T score -2.2 left total hip (Z score -1.3, decline of 8.9%, T score -2.3 lumbar spine (Z score -1.1, decline of 4%).  Estimated 10-year fracture risk by FRAX is 17.3% for major osteoporotic fracture and 2.8% for hip fracture.     She does take calcium 500 mg daily and vitamin D 4000 IU daily.     Past history of treatment:   MTX started 2011 , increased to 20 mg weekly 6/16.   Enbrel- had secondary failure after 1 1/2 years.   Humira started 5/15.      x-rays of her hands done July 2019. The left index finger PIP joint shows soft tissue swelling, perhaps mild joint space narrowing but no erosions.     Labs January  "2023: CBC normal, CMP normal, CRP 0.11 (less than 1)  Labs April 2023: CBC normal except white blood cell count 4.3, CMP normal, CRP less than 0.1 (less than 1)  Labs July 2023: CBC normal, CMP normal except random glucose 100, CRP 0.1 (normal less than 1)  Labs August 2023: Cholesterol 206, HDL 86, , triglycerides 59  Labs 10/23: CMP normal, CBC normal, CRP 0.19 (less than 1)  January 2024: CRP 0.11 (normal less than 1), CMP normal, CBC with differential normal     Health maintenance:   Prevnar-13  7/21, pneumovax 1/23   Pfizer COVID-19 vaccine March 15, 2021, April 5, 2021, booster 11/06/21, BV Pfizer booster 11/22  Pfizer COVID vaccine 10/23   Flu shot 9/15/23   Tdap 5/17/21      ROS:  General: Denies fevers or chills.  CV: Denies chest pain or palpitations.  Denies leg edema.  Lungs: Denies coughing or shortness of breath.  Skin: Denies rashes or nodules.  MS: Complains of left buttock pain with occasional left groin pain as well as left thigh pain-see history of present illness for details.       Objective   /88 (BP Location: Left arm, Patient Position: Sitting, BP Cuff Size: Small adult)   Pulse 61   Temp 36.3 °C (97.3 °F)   Ht 1.626 m (5' 4\")   Wt 63.8 kg (140 lb 9.6 oz)   SpO2 99%   BMI 24.13 kg/m²   Visit Vitals  /88 (BP Location: Left arm, Patient Position: Sitting, BP Cuff Size: Small adult)   Pulse 61   Temp 36.3 °C (97.3 °F)   Ht 1.626 m (5' 4\")   Wt 63.8 kg (140 lb 9.6 oz)   SpO2 99%   BMI 24.13 kg/m²   Smoking Status Never   BSA 1.7 m²      Physical Exam  HEENT: PERRL, EOMI  Neck: Supple, no nodes.  CV: RRR, no MGR.  Lungs: Clear, no rales or wheezes.  Abdomen: Soft, nontender. No hepatosplenomegaly.  Extremities:  No cyanosis, clubbing, or edema.  MS: No synovitis.          Mild bony prominence of right third and fifth PIP joints, left second and fifth PIP joints.  She lacks a few degrees of extension of both elbows, right greater than left.  Skin: No rashes or nodules.  No " psoriatic plaques seen today.   Slight limitation of internal rotation of left hip, which causes some pain.  She has some pain with flexion of the hip.    Assessment/Plan   Problem List Items Addressed This Visit             ICD-10-CM    Psoriatic arthropathy (Multi) - Primary L40.50    Relevant Orders    Comprehensive Metabolic Panel    CBC and Auto Differential    C-Reactive Protein    BMI 24.0-24.9, adult Z68.24     Other Visit Diagnoses         Codes    Postmenopausal estrogen deficiency     Z78.0    Relevant Orders    XR DEXA bone density                1. Psoriatic arthritis- currently doing well.      2. Left distal radius fracture 1/22- required surgery 2/22.    DEXA May 2022 shows T score -2.2 left total hip, T score -2.2 left femoral neck, T score -2.3 lumbar spine.   estimated 10-year fracture risk by FRAX is 17.3% for major osteoporotic fracture and 2.8% for hip fracture.   Continue to try to get calcium 1200 mg daily and vitamin D 2000 IU daily.   DEXA will be repeated May 2024.    3. BMI 24- stable.    4.  OA left hip-  moderate by x-ray done April 2024.  She had corticosteroid injection about 1 week ago with slight relief.  She is currently being followed by Dr. Gunn in orthopedic surgery.    Plan:  Please schedule bone density after 5/03/24.  Check labs: CBC with diff, CMP, CRP.  Recommend low impact exercise because of left hip arthritis.  Follow-up in 3 months.

## 2024-05-03 DIAGNOSIS — L40.50 PSORIATIC ARTHRITIS (MULTI): ICD-10-CM

## 2024-05-03 RX ORDER — FOLIC ACID 1 MG/1
1 TABLET ORAL DAILY
Qty: 30 TABLET | Refills: 5 | Status: SHIPPED | OUTPATIENT
Start: 2024-05-03

## 2024-05-04 ENCOUNTER — HOSPITAL ENCOUNTER (OUTPATIENT)
Dept: RADIOLOGY | Facility: CLINIC | Age: 64
Discharge: HOME | End: 2024-05-04
Payer: COMMERCIAL

## 2024-05-04 DIAGNOSIS — Z78.0 POSTMENOPAUSAL ESTROGEN DEFICIENCY: ICD-10-CM

## 2024-05-04 PROCEDURE — 77080 DXA BONE DENSITY AXIAL: CPT

## 2024-05-28 ENCOUNTER — LAB (OUTPATIENT)
Dept: LAB | Facility: LAB | Age: 64
End: 2024-05-28
Payer: COMMERCIAL

## 2024-05-28 DIAGNOSIS — Z01.812 ENCOUNTER FOR PREPROCEDURAL LABORATORY EXAMINATION: Primary | ICD-10-CM

## 2024-05-28 PROCEDURE — 82565 ASSAY OF CREATININE: CPT

## 2024-05-28 PROCEDURE — 84520 ASSAY OF UREA NITROGEN: CPT

## 2024-05-28 PROCEDURE — 36415 COLL VENOUS BLD VENIPUNCTURE: CPT

## 2024-05-29 LAB
BUN SERPL-MCNC: 15 MG/DL (ref 6–23)
CREAT SERPL-MCNC: 0.68 MG/DL (ref 0.5–1.05)
EGFRCR SERPLBLD CKD-EPI 2021: >90 ML/MIN/1.73M*2

## 2024-06-05 ENCOUNTER — HOSPITAL ENCOUNTER (OUTPATIENT)
Dept: RADIOLOGY | Facility: HOSPITAL | Age: 64
Discharge: HOME | End: 2024-06-05
Payer: COMMERCIAL

## 2024-06-05 DIAGNOSIS — R92.2 INCONCLUSIVE MAMMOGRAM: ICD-10-CM

## 2024-06-05 PROCEDURE — 2550000001 HC RX 255 CONTRASTS

## 2024-06-05 PROCEDURE — A9575 INJ GADOTERATE MEGLUMI 0.1ML: HCPCS

## 2024-06-05 PROCEDURE — 6100000003 BI MR BREAST BILATERAL WITH CONTRAST FAST SCREENING SELF PAY

## 2024-06-05 RX ORDER — GADOTERATE MEGLUMINE 376.9 MG/ML
12 INJECTION INTRAVENOUS
Status: COMPLETED | OUTPATIENT
Start: 2024-06-05 | End: 2024-06-05

## 2024-06-05 RX ADMIN — GADOTERATE MEGLUMINE 12 ML: 376.9 INJECTION INTRAVENOUS at 07:40

## 2024-06-14 DIAGNOSIS — L40.50 PSORIATIC ARTHRITIS (MULTI): ICD-10-CM

## 2024-06-14 RX ORDER — METHOTREXATE 2.5 MG/1
TABLET ORAL
Qty: 24 TABLET | Refills: 2 | Status: SHIPPED | OUTPATIENT
Start: 2024-06-14

## 2024-07-25 ENCOUNTER — LAB (OUTPATIENT)
Dept: LAB | Facility: LAB | Age: 64
End: 2024-07-25
Payer: COMMERCIAL

## 2024-07-25 ENCOUNTER — APPOINTMENT (OUTPATIENT)
Dept: RHEUMATOLOGY | Facility: CLINIC | Age: 64
End: 2024-07-25
Payer: COMMERCIAL

## 2024-07-25 VITALS
DIASTOLIC BLOOD PRESSURE: 74 MMHG | HEART RATE: 67 BPM | TEMPERATURE: 96.3 F | SYSTOLIC BLOOD PRESSURE: 122 MMHG | BODY MASS INDEX: 24.24 KG/M2 | WEIGHT: 141.2 LBS | OXYGEN SATURATION: 97 %

## 2024-07-25 DIAGNOSIS — L40.50 PSORIATIC ARTHROPATHY (MULTI): ICD-10-CM

## 2024-07-25 DIAGNOSIS — M81.0 AGE-RELATED OSTEOPOROSIS WITHOUT CURRENT PATHOLOGICAL FRACTURE: ICD-10-CM

## 2024-07-25 DIAGNOSIS — L40.50 PSORIATIC ARTHROPATHY (MULTI): Primary | ICD-10-CM

## 2024-07-25 PROBLEM — M10.9 GOUT: Status: RESOLVED | Noted: 2023-10-23 | Resolved: 2024-07-25

## 2024-07-25 LAB
25(OH)D3 SERPL-MCNC: 71 NG/ML (ref 30–100)
ALBUMIN SERPL BCP-MCNC: 4.4 G/DL (ref 3.4–5)
ALP SERPL-CCNC: 65 U/L (ref 33–136)
ALT SERPL W P-5'-P-CCNC: 19 U/L (ref 7–45)
ANION GAP SERPL CALC-SCNC: 11 MMOL/L (ref 10–20)
AST SERPL W P-5'-P-CCNC: 24 U/L (ref 9–39)
BASOPHILS # BLD AUTO: 0.02 X10*3/UL (ref 0–0.1)
BASOPHILS NFR BLD AUTO: 0.5 %
BILIRUB SERPL-MCNC: 0.6 MG/DL (ref 0–1.2)
BUN SERPL-MCNC: 15 MG/DL (ref 6–23)
CALCIUM SERPL-MCNC: 9.2 MG/DL (ref 8.6–10.6)
CHLORIDE SERPL-SCNC: 99 MMOL/L (ref 98–107)
CO2 SERPL-SCNC: 31 MMOL/L (ref 21–32)
CREAT SERPL-MCNC: 0.81 MG/DL (ref 0.5–1.05)
CRP SERPL-MCNC: 0.42 MG/DL
EGFRCR SERPLBLD CKD-EPI 2021: 82 ML/MIN/1.73M*2
EOSINOPHIL # BLD AUTO: 0.05 X10*3/UL (ref 0–0.7)
EOSINOPHIL NFR BLD AUTO: 1.3 %
ERYTHROCYTE [DISTWIDTH] IN BLOOD BY AUTOMATED COUNT: 12.8 % (ref 11.5–14.5)
GLUCOSE SERPL-MCNC: 91 MG/DL (ref 74–99)
HCT VFR BLD AUTO: 40.9 % (ref 36–46)
HGB BLD-MCNC: 13.1 G/DL (ref 12–16)
IMM GRANULOCYTES # BLD AUTO: 0.01 X10*3/UL (ref 0–0.7)
IMM GRANULOCYTES NFR BLD AUTO: 0.3 % (ref 0–0.9)
LYMPHOCYTES # BLD AUTO: 1.57 X10*3/UL (ref 1.2–4.8)
LYMPHOCYTES NFR BLD AUTO: 41.8 %
MCH RBC QN AUTO: 30.1 PG (ref 26–34)
MCHC RBC AUTO-ENTMCNC: 32 G/DL (ref 32–36)
MCV RBC AUTO: 94 FL (ref 80–100)
MONOCYTES # BLD AUTO: 0.66 X10*3/UL (ref 0.1–1)
MONOCYTES NFR BLD AUTO: 17.6 %
NEUTROPHILS # BLD AUTO: 1.45 X10*3/UL (ref 1.2–7.7)
NEUTROPHILS NFR BLD AUTO: 38.5 %
NRBC BLD-RTO: 0 /100 WBCS (ref 0–0)
PLATELET # BLD AUTO: 208 X10*3/UL (ref 150–450)
POTASSIUM SERPL-SCNC: 4.2 MMOL/L (ref 3.5–5.3)
PROT SERPL-MCNC: 7.1 G/DL (ref 6.4–8.2)
RBC # BLD AUTO: 4.35 X10*6/UL (ref 4–5.2)
SODIUM SERPL-SCNC: 137 MMOL/L (ref 136–145)
TSH SERPL-ACNC: 2.36 MIU/L (ref 0.44–3.98)
WBC # BLD AUTO: 3.8 X10*3/UL (ref 4.4–11.3)

## 2024-07-25 PROCEDURE — 84443 ASSAY THYROID STIM HORMONE: CPT

## 2024-07-25 PROCEDURE — 83970 ASSAY OF PARATHORMONE: CPT

## 2024-07-25 PROCEDURE — 86140 C-REACTIVE PROTEIN: CPT

## 2024-07-25 PROCEDURE — 82306 VITAMIN D 25 HYDROXY: CPT

## 2024-07-25 PROCEDURE — 36415 COLL VENOUS BLD VENIPUNCTURE: CPT

## 2024-07-25 PROCEDURE — 80053 COMPREHEN METABOLIC PANEL: CPT

## 2024-07-25 PROCEDURE — 99214 OFFICE O/P EST MOD 30 MIN: CPT | Performed by: INTERNAL MEDICINE

## 2024-07-25 PROCEDURE — 85025 COMPLETE CBC W/AUTO DIFF WBC: CPT

## 2024-07-25 RX ORDER — ALENDRONATE SODIUM 70 MG/1
70 TABLET ORAL
Qty: 12 TABLET | Refills: 3 | Status: SHIPPED | OUTPATIENT
Start: 2024-07-25 | End: 2025-07-25

## 2024-07-25 NOTE — PATIENT INSTRUCTIONS
Start alendronate 70 mg orally once weekly. Take this 30 minutes before eating for the day. Drink 6-8 ounces of water with this. Please make sure you sit upright for 30 minutes after taking.   Hold Humira for 2 weeks before surgery. Do not resume until 2 weeks after surgery (as long as wound shows no signs of infection and sutures have been removed.  Check CBC with diff, CMP, CRP, 25-hydroxy Vitamin D, PTH, TSH.  Follow-up in 3 months.

## 2024-07-25 NOTE — PROGRESS NOTES
Subjective   Patient ID: Maryann Brennan is a 64 y.o. female who presents for regarding psoriatic arthritis.    HPI  63 yo F here for follow-up regarding psoriatic arthritis. Her psoriasis started in her scalp in 2005.   Her joint pain started in 2011.      She remains on methotrexate to 15 mg orally weekly (dose reduced 11/17 because WBC 2.6) , folic acid 1 mg daily, and Humira 40 mg subcutaneous every 2 weeks.      She is overall doing well. She is not having much morning stiffness. Skin is doing well.    She has been struggling with pain in the left buttock area for about 1 year.  She gets occasional left groin pain.  It sometimes radiates down the left thigh to the knee.  She saw Dr. Gunn in orthopedic surgery regarding this.  X-ray of the left hip done April 17, 2024 shows moderate OA, with joint space narrowing, sclerosis and osteophytes.  X-ray lumbar spine April 2, 2024 shows mild to space narrowing at L5-S1, and mild facet disease in the lower lumbar spine.    She had a corticosteroid injection of the left hip  which has helped slightly.  She is now scheduled for left hip replacement September 5, 2024.     She did fall on ice 1/22, suffered a fracture of her distal left radius which required surgery 2/22.     DEXA May 2022: T score -2.2 left femoral neck (Z score -1.0, decline of 8%), T score -2.2 left total hip (Z score -1.3, decline of 8.9%, T score -2.3 lumbar spine (Z score -1.1, decline of 4%).  Estimated 10-year fracture risk by FRAX is 17.3% for major osteoporotic fracture and 2.8% for hip fracture.  DEXA April 2024: T-score -3.1 left total hip (declined to 16.3%, Z-score -2.1), T-score -2.4 left femoral neck (decline of 4.2%, Z-score -1.1), T-score -2.3 lumbar spine (stable)     She does take calcium 500 mg daily and vitamin D 4000 IU daily.     Past history of treatment:   MTX started 2011 , increased to 20 mg weekly 6/16.   Enbrel- had secondary failure after 1 1/2 years.   Humira started  5/15.      x-rays of her hands done July 2019. The left index finger PIP joint shows soft tissue swelling, perhaps mild joint space narrowing but no erosions.     Labs August 2023: Cholesterol 206, HDL 86, , triglycerides 59  Labs 10/23: CMP normal, CBC normal, CRP 0.19 (less than 1)  January 2024: CRP 0.11 (normal less than 1), CMP normal, CBC with differential normal  Labs April 2024: CBC normal, CMP normal, CRP less than 0.1 (less than 1)     Health maintenance:   Prevnar-13  7/21, pneumovax 1/23   Pfizer COVID-19 vaccine March 15, 2021, April 5, 2021, booster 11/06/21, BV Pfizer booster 11/22  Pfizer COVID vaccine 10/23   Flu shot 9/15/23   Tdap 5/17/21      ROS:  General: Denies fevers or chills.  CV: Denies chest pain or palpitations.  Denies leg edema.  Lungs: Denies coughing or shortness of breath.  Skin: Denies rashes or nodules.  MS: Complains of left buttock pain with occasional left groin pain as well as left thigh pain-scheduled for left hip replacement September 5, 2024.       Objective   /74 (BP Location: Left arm, Patient Position: Sitting, BP Cuff Size: Adult)   Pulse 67   Temp 35.7 °C (96.3 °F) (Temporal)   Wt 64 kg (141 lb 3.2 oz)   SpO2 97%   BMI 24.24 kg/m²   Visit Vitals  /74 (BP Location: Left arm, Patient Position: Sitting, BP Cuff Size: Adult)   Pulse 67   Temp 35.7 °C (96.3 °F) (Temporal)   Wt 64 kg (141 lb 3.2 oz)   SpO2 97%   BMI 24.24 kg/m²   Smoking Status Never   BSA 1.7 m²      Physical Exam  HEENT: PERRL, EOMI  Neck: Supple, no nodes.  CV: RRR, no MGR.  Lungs: Clear, no rales or wheezes.  Abdomen: Soft, nontender. No hepatosplenomegaly.  Extremities:  No cyanosis, clubbing, or edema.  MS: No synovitis.          Mild bony prominence of right third and fifth PIP joints, left second and fifth PIP joints.  She lacks a few degrees of extension of both elbows, right greater than left.  Skin: No rashes or nodules.  No psoriatic plaques seen today.   Slight limitation  of internal rotation of left hip, which causes some pain.  She has some pain with flexion of the hip.    Assessment/Plan   Problem List Items Addressed This Visit             ICD-10-CM    Psoriatic arthropathy (Multi) - Primary L40.50    Relevant Orders    CBC and Auto Differential (Completed)    Comprehensive Metabolic Panel (Completed)    C-Reactive Protein (Completed)    Age-related osteoporosis without current pathological fracture M81.0    Relevant Medications    alendronate (Fosamax) 70 mg tablet    Other Relevant Orders    Vitamin D 25-Hydroxy,Total (for eval of Vitamin D levels) (Completed)    PTH, intact (Completed)    Tsh With Reflex To Free T4 If Abnormal (Completed)         1. Psoriatic arthritis- currently doing well.      2.  Osteoporosis by DEXA May 2024-also has history of left distal radius fracture 1/22- required surgery 2/22.   Recommend treatment with alendronate 70 mg orally once weekly. Warned of risk of esophagitis, ONJ, and atypical hip fractures. Advised on proper dosing instructions    3. BMI 24- stable.    4.  OA left hip-  moderate by x-ray done April 2024.  She had minimal response to corticosteroid injection.  She is scheduled to get her left hip replaced September 4, 2024.    Plan:  Start alendronate 70 mg orally once weekly. Take this 30 minutes before eating for the day. Drink 6-8 ounces of water with this. Please make sure you sit upright for 30 minutes after taking.   Hold Humira for 2 weeks before surgery. Do not resume until 2 weeks after surgery (as long as wound shows no signs of infection and sutures have been removed.  Check CBC with diff, CMP, CRP, 25-hydroxy Vitamin D, PTH, TSH.  Follow-up in 3 months.

## 2024-07-26 LAB — PTH-INTACT SERPL-MCNC: 30.6 PG/ML (ref 18.5–88)

## 2024-09-10 DIAGNOSIS — L40.50 PSORIATIC ARTHRITIS (MULTI): ICD-10-CM

## 2024-09-10 RX ORDER — METHOTREXATE 2.5 MG/1
TABLET ORAL
Qty: 24 TABLET | Refills: 2 | Status: SHIPPED | OUTPATIENT
Start: 2024-09-10

## 2024-10-22 ENCOUNTER — APPOINTMENT (OUTPATIENT)
Dept: PRIMARY CARE | Facility: CLINIC | Age: 64
End: 2024-10-22
Payer: COMMERCIAL

## 2024-10-25 ENCOUNTER — APPOINTMENT (OUTPATIENT)
Dept: RHEUMATOLOGY | Facility: CLINIC | Age: 64
End: 2024-10-25
Payer: COMMERCIAL

## 2024-10-25 ENCOUNTER — LAB (OUTPATIENT)
Dept: LAB | Facility: LAB | Age: 64
End: 2024-10-25
Payer: COMMERCIAL

## 2024-10-25 VITALS
HEART RATE: 63 BPM | DIASTOLIC BLOOD PRESSURE: 75 MMHG | SYSTOLIC BLOOD PRESSURE: 118 MMHG | BODY MASS INDEX: 24.72 KG/M2 | OXYGEN SATURATION: 98 % | RESPIRATION RATE: 16 BRPM | WEIGHT: 144.8 LBS | TEMPERATURE: 97.6 F | HEIGHT: 64 IN

## 2024-10-25 DIAGNOSIS — Z00.00 HEALTHCARE MAINTENANCE: ICD-10-CM

## 2024-10-25 DIAGNOSIS — L40.50 PSORIATIC ARTHROPATHY (MULTI): Primary | ICD-10-CM

## 2024-10-25 DIAGNOSIS — L40.50 PSORIATIC ARTHROPATHY (MULTI): ICD-10-CM

## 2024-10-25 LAB
ALBUMIN SERPL BCP-MCNC: 4.4 G/DL (ref 3.4–5)
ALP SERPL-CCNC: 65 U/L (ref 33–136)
ALT SERPL W P-5'-P-CCNC: 14 U/L (ref 7–45)
ANION GAP SERPL CALC-SCNC: 12 MMOL/L (ref 10–20)
AST SERPL W P-5'-P-CCNC: 21 U/L (ref 9–39)
BASOPHILS # BLD AUTO: 0.03 X10*3/UL (ref 0–0.1)
BASOPHILS NFR BLD AUTO: 0.8 %
BILIRUB SERPL-MCNC: 0.5 MG/DL (ref 0–1.2)
BUN SERPL-MCNC: 16 MG/DL (ref 6–23)
CALCIUM SERPL-MCNC: 9 MG/DL (ref 8.6–10.6)
CHLORIDE SERPL-SCNC: 101 MMOL/L (ref 98–107)
CHOLEST SERPL-MCNC: 203 MG/DL (ref 0–199)
CHOLESTEROL/HDL RATIO: 2.3
CO2 SERPL-SCNC: 28 MMOL/L (ref 21–32)
CREAT SERPL-MCNC: 0.66 MG/DL (ref 0.5–1.05)
CRP SERPL-MCNC: 0.11 MG/DL
EGFRCR SERPLBLD CKD-EPI 2021: >90 ML/MIN/1.73M*2
EOSINOPHIL # BLD AUTO: 0.08 X10*3/UL (ref 0–0.7)
EOSINOPHIL NFR BLD AUTO: 2.1 %
ERYTHROCYTE [DISTWIDTH] IN BLOOD BY AUTOMATED COUNT: 13.3 % (ref 11.5–14.5)
GLUCOSE SERPL-MCNC: 90 MG/DL (ref 74–99)
HCT VFR BLD AUTO: 39.7 % (ref 36–46)
HDLC SERPL-MCNC: 87.5 MG/DL
HGB BLD-MCNC: 12.7 G/DL (ref 12–16)
IMM GRANULOCYTES # BLD AUTO: 0 X10*3/UL (ref 0–0.7)
IMM GRANULOCYTES NFR BLD AUTO: 0 % (ref 0–0.9)
LDLC SERPL CALC-MCNC: 107 MG/DL
LYMPHOCYTES # BLD AUTO: 1.76 X10*3/UL (ref 1.2–4.8)
LYMPHOCYTES NFR BLD AUTO: 46.8 %
MCH RBC QN AUTO: 29.5 PG (ref 26–34)
MCHC RBC AUTO-ENTMCNC: 32 G/DL (ref 32–36)
MCV RBC AUTO: 92 FL (ref 80–100)
MONOCYTES # BLD AUTO: 0.51 X10*3/UL (ref 0.1–1)
MONOCYTES NFR BLD AUTO: 13.6 %
NEUTROPHILS # BLD AUTO: 1.38 X10*3/UL (ref 1.2–7.7)
NEUTROPHILS NFR BLD AUTO: 36.7 %
NON HDL CHOLESTEROL: 116 MG/DL (ref 0–149)
NRBC BLD-RTO: 0 /100 WBCS (ref 0–0)
PLATELET # BLD AUTO: 241 X10*3/UL (ref 150–450)
POTASSIUM SERPL-SCNC: 4.1 MMOL/L (ref 3.5–5.3)
PROT SERPL-MCNC: 7 G/DL (ref 6.4–8.2)
RBC # BLD AUTO: 4.3 X10*6/UL (ref 4–5.2)
SODIUM SERPL-SCNC: 137 MMOL/L (ref 136–145)
TRIGL SERPL-MCNC: 45 MG/DL (ref 0–149)
VLDL: 9 MG/DL (ref 0–40)
WBC # BLD AUTO: 3.8 X10*3/UL (ref 4.4–11.3)

## 2024-10-25 PROCEDURE — 80061 LIPID PANEL: CPT

## 2024-10-25 PROCEDURE — 86140 C-REACTIVE PROTEIN: CPT

## 2024-10-25 PROCEDURE — 90471 IMMUNIZATION ADMIN: CPT | Performed by: INTERNAL MEDICINE

## 2024-10-25 PROCEDURE — 36415 COLL VENOUS BLD VENIPUNCTURE: CPT

## 2024-10-25 PROCEDURE — 85025 COMPLETE CBC W/AUTO DIFF WBC: CPT

## 2024-10-25 PROCEDURE — 1036F TOBACCO NON-USER: CPT | Performed by: INTERNAL MEDICINE

## 2024-10-25 PROCEDURE — 90656 IIV3 VACC NO PRSV 0.5 ML IM: CPT | Performed by: INTERNAL MEDICINE

## 2024-10-25 PROCEDURE — 99214 OFFICE O/P EST MOD 30 MIN: CPT | Performed by: INTERNAL MEDICINE

## 2024-10-25 PROCEDURE — 80053 COMPREHEN METABOLIC PANEL: CPT

## 2024-10-25 PROCEDURE — 3008F BODY MASS INDEX DOCD: CPT | Performed by: INTERNAL MEDICINE

## 2024-10-25 ASSESSMENT — PATIENT HEALTH QUESTIONNAIRE - PHQ9
2. FEELING DOWN, DEPRESSED OR HOPELESS: NOT AT ALL
1. LITTLE INTEREST OR PLEASURE IN DOING THINGS: NOT AT ALL
SUM OF ALL RESPONSES TO PHQ9 QUESTIONS 1 AND 2: 0

## 2024-10-25 NOTE — PROGRESS NOTES
Subjective   Patient ID: Maryann Brennan is a 64 y.o. female who presents for regarding psoriatic arthritis.    HPI  65 yo F here for follow-up regarding psoriatic arthritis. Her psoriasis started in her scalp in 2005.   Her joint pain started in 2011.      She remains on methotrexate to 15 mg orally weekly (dose reduced 11/17 because WBC 2.6) , folic acid 1 mg daily, and Humira 40 mg subcutaneous every 2 weeks.      She is overall doing well. She is not having much morning stiffness. Skin is doing well.    She had her left hip replaced September 2024 .  She was off Humira for about 8 weeks, due to the timing of the surgery.  The surgeon also wanted her to wait until all the scabbing had healed over her wound.  She was starting to get aching in her hands at the end of the 8 weeks, but she is now feeling better.    x-ray of the left hip done April 17, 2024 shows moderate OA, with joint space narrowing, sclerosis and osteophytes.  X-ray lumbar spine April 2, 2024 shows mild to space narrowing at L5-S1, and mild facet disease in the lower lumbar spine.    She did fall on ice 1/22, suffered a fracture of her distal left radius which required surgery 2/22.     DEXA May 2022: T score -2.2 left femoral neck (Z score -1.0, decline of 8%), T score -2.2 left total hip (Z score -1.3, decline of 8.9%, T score -2.3 lumbar spine (Z score -1.1, decline of 4%).  Estimated 10-year fracture risk by FRAX is 17.3% for major osteoporotic fracture and 2.8% for hip fracture.  DEXA April 2024: T-score -3.1 left total hip (declined to 16.3%, Z-score -2.1), T-score -2.4 left femoral neck (decline of 4.2%, Z-score -1.1), T-score -2.3 lumbar spine (stable)     She does take calcium 500 mg daily and vitamin D 4000 IU daily.     Past history of treatment:   MTX started 2011 , increased to 20 mg weekly 6/16.   Enbrel- had secondary failure after 1 1/2 years.   Humira started 5/15.      x-rays of her hands done July 2019. The left index finger  "PIP joint shows soft tissue swelling, perhaps mild joint space narrowing but no erosions.     Labs August 2023: Cholesterol 206, HDL 86, , triglycerides 59  Labs 10/23: CMP normal, CBC normal, CRP 0.19 (less than 1)  January 2024: CRP 0.11 (normal less than 1), CMP normal, CBC with differential normal  Labs April 2024: CBC normal, CMP normal, CRP less than 0.1 (less than 1)  Labs 7/24: CMP normal, CBC normal except white blood cell count 3.8, CRP 0.42 (less than 1)  Labs 9/24: CBC normal except hemoglobin 8.3 and hematocrit 25.2, BMP normal except glucose 146 and calcium 7.6     Health maintenance:   Prevnar-13  7/21, pneumovax 1/23   Pfizer COVID-19 vaccine March 15, 2021, April 5, 2021, booster 11/06/21, BV Pfizer booster 11/22  Pfizer COVID vaccine 10/23   Flu shot 9/15/23   Tdap 5/17/21      ROS:  General: Denies fevers or chills.  CV: Denies chest pain or palpitations.  Denies leg edema.  Lungs: Denies coughing or shortness of breath.  Skin: Denies rashes or nodules.  MS: Denies joint pain or joint swelling.      Objective   There were no vitals taken for this visit.  Visit Vitals  Smoking Status Never    Visit Vitals  /75 (BP Location: Left arm, Patient Position: Sitting, BP Cuff Size: Adult)   Pulse 63   Temp 36.4 °C (97.6 °F) (Skin)   Resp 16   Ht 1.626 m (5' 4\")   Wt 65.7 kg (144 lb 12.8 oz)   SpO2 98%   BMI 24.85 kg/m²   Smoking Status Never   BSA 1.72 m²      Physical Exam  HEENT: PERRL, EOMI  Neck: Supple, no nodes.  CV: RRR, no MGR.  Lungs: Clear, no rales or wheezes.  Abdomen: Soft, nontender. No hepatosplenomegaly.  Extremities:  No cyanosis, clubbing, or edema.  MS: No synovitis.          Mild bony prominence of right third and fifth PIP joints, left second and fifth PIP joints.  She lacks a few degrees of extension of both elbows, right greater than left.  S/p left hip replacement.  Skin: No rashes or nodules.  No psoriatic plaques seen today.         Assessment/Plan   Problem List " Items Addressed This Visit             ICD-10-CM    Psoriatic arthropathy (Multi) - Primary L40.50     1. Psoriatic arthritis- currently doing well.      2.  Osteoporosis by DEXA May 2024-also has history of left distal radius fracture 1/22- required surgery 2/22.   Started alendronate 70 mg orally once weekly 7/24.    3. BMI 24- stable.    4.  OA left hip-  s/p left hip replacement September 5, 2024.    Plan:  Flu shot was given today. Skip 1 dose of methotrexate after vaccine to get better response to vaccine.  Wait 2 weeks before getting COVID vaccine. Skip 1 dose of methotrexate after COVID vaccine as well.  Check labs: CMP, CBC with diff, CRP, lipids.  Follow-up in 3 months.

## 2024-10-25 NOTE — PATIENT INSTRUCTIONS
Flu shot was given today. Skip 1 dose of methotrexate after vaccine to get better response to vaccine.  Wait 2 weeks before getting COVID vaccine. Skip 1 dose of methotrexate after COVID vaccine as well.  Check labs: CMP, CBC with diff, CRP, lipids.  Follow-up in 3 months.

## 2024-11-01 DIAGNOSIS — L40.50 PSORIATIC ARTHRITIS (MULTI): ICD-10-CM

## 2024-11-01 RX ORDER — ADALIMUMAB 40MG/0.4ML
KIT SUBCUTANEOUS
Qty: 6 EACH | Refills: 3 | Status: SHIPPED | OUTPATIENT
Start: 2024-11-01

## 2024-11-13 DIAGNOSIS — L40.50 PSORIATIC ARTHRITIS (MULTI): ICD-10-CM

## 2024-11-13 RX ORDER — ADALIMUMAB 40MG/0.4ML
40 KIT SUBCUTANEOUS
Qty: 6 EACH | Refills: 3 | Status: SHIPPED | OUTPATIENT
Start: 2024-11-13

## 2024-12-06 DIAGNOSIS — L40.50 PSORIATIC ARTHRITIS (MULTI): ICD-10-CM

## 2024-12-06 RX ORDER — METHOTREXATE 2.5 MG/1
TABLET ORAL
Qty: 24 TABLET | Refills: 2 | Status: SHIPPED | OUTPATIENT
Start: 2024-12-06

## 2024-12-13 DIAGNOSIS — L40.50 PSORIATIC ARTHRITIS (MULTI): ICD-10-CM

## 2024-12-13 RX ORDER — FOLIC ACID 1 MG/1
1 TABLET ORAL DAILY
Qty: 90 TABLET | Refills: 1 | Status: SHIPPED | OUTPATIENT
Start: 2024-12-13

## 2025-01-30 ENCOUNTER — APPOINTMENT (OUTPATIENT)
Dept: RHEUMATOLOGY | Facility: CLINIC | Age: 65
End: 2025-01-30
Payer: COMMERCIAL

## 2025-01-30 VITALS
OXYGEN SATURATION: 99 % | HEIGHT: 64 IN | RESPIRATION RATE: 16 BRPM | SYSTOLIC BLOOD PRESSURE: 121 MMHG | HEART RATE: 70 BPM | TEMPERATURE: 98 F | BODY MASS INDEX: 24.52 KG/M2 | DIASTOLIC BLOOD PRESSURE: 81 MMHG | WEIGHT: 143.6 LBS

## 2025-01-30 DIAGNOSIS — L40.50 PSORIATIC ARTHROPATHY (MULTI): ICD-10-CM

## 2025-01-30 PROCEDURE — 1036F TOBACCO NON-USER: CPT | Performed by: INTERNAL MEDICINE

## 2025-01-30 PROCEDURE — 3008F BODY MASS INDEX DOCD: CPT | Performed by: INTERNAL MEDICINE

## 2025-01-30 PROCEDURE — 99214 OFFICE O/P EST MOD 30 MIN: CPT | Performed by: INTERNAL MEDICINE

## 2025-01-30 ASSESSMENT — PATIENT HEALTH QUESTIONNAIRE - PHQ9
1. LITTLE INTEREST OR PLEASURE IN DOING THINGS: NOT AT ALL
SUM OF ALL RESPONSES TO PHQ9 QUESTIONS 1 AND 2: 0
2. FEELING DOWN, DEPRESSED OR HOPELESS: NOT AT ALL

## 2025-01-30 ASSESSMENT — PAIN SCALES - GENERAL: PAINLEVEL_OUTOF10: 0-NO PAIN

## 2025-01-30 NOTE — PROGRESS NOTES
Subjective   Patient ID: Maryann Brennan is a 64 y.o. female who presents for regarding psoriatic arthritis.    HPI  63 yo F here for follow-up regarding psoriatic arthritis. Her psoriasis started in her scalp in 2005.   Her joint pain started in 2011.      She remains on methotrexate to 15 mg orally weekly (dose reduced 11/17 because WBC 2.6) , folic acid 1 mg daily, and Humira 40 mg subcutaneous every 2 weeks.      She is overall doing well. She is not having much morning stiffness. Skin is doing well.    She had her left hip replaced September 2024 .  She was off Humira for about 8 weeks, due to the timing of the surgery.  The surgeon also wanted her to wait until all the scabbing had healed over her wound.  She was starting to get aching in her hands at the end of the 8 weeks, but she is now feeling better.    x-ray of the left hip done April 17, 2024 shows moderate OA, with joint space narrowing, sclerosis and osteophytes.  X-ray lumbar spine April 2, 2024 shows mild to space narrowing at L5-S1, and mild facet disease in the lower lumbar spine.    She did fall on ice 1/22, suffered a fracture of her distal left radius which required surgery 2/22.     DEXA May 2022: T score -2.2 left femoral neck (Z score -1.0, decline of 8%), T score -2.2 left total hip (Z score -1.3, decline of 8.9%, T score -2.3 lumbar spine (Z score -1.1, decline of 4%).  Estimated 10-year fracture risk by FRAX is 17.3% for major osteoporotic fracture and 2.8% for hip fracture.  DEXA April 2024: T-score -3.1 left total hip (declined to 16.3%, Z-score -2.1), T-score -2.4 left femoral neck (decline of 4.2%, Z-score -1.1), T-score -2.3 lumbar spine (stable)     She does take calcium 500 mg daily and vitamin D 4000 IU daily.     Past history of treatment:   MTX started 2011 , increased to 20 mg weekly 6/16.   Enbrel- had secondary failure after 1 1/2 years.   Humira started 5/15.      x-rays of her hands done July 2019. The left index finger  "PIP joint shows soft tissue swelling, perhaps mild joint space narrowing but no erosions.     Labs 7/24: CMP normal, CBC normal except white blood cell count 3.8, CRP 0.42 (less than 1)  Labs 9/24: CBC normal except hemoglobin 8.3 and hematocrit 25.2, BMP normal except glucose 146 and calcium 7.6  Labs October 2024: CBC normal except white blood cell count 3.8, CMP normal, CRP 0.11 (normal less than 1), cholesterol 203, HDL 87, , triglycerides 45     Health maintenance:   Prevnar-13  7/21, pneumovax 1/23   Pfizer COVID-19 vaccine March 15, 2021, April 5, 2021, booster 11/06/21, BV Pfizer booster 11/22  Pfizer COVID vaccine 11/10/24   Flu shot 10/24   Tdap 5/17/21     Medical problem list:  Psoriatic arthritis  Osteoporosis    Surgeries:  Left hip replacement September 2024  Left distal radius fracture 2/22  D&C 2015     ROS:  General: Denies fevers or chills.  CV: Denies chest pain or palpitations.  Denies leg edema.  Lungs: Denies coughing or shortness of breath.  Skin: Denies rashes or nodules.  MS: Denies joint pain or joint swelling.      Objective   /81 (BP Location: Left arm, Patient Position: Sitting, BP Cuff Size: Adult)   Pulse 70   Temp 36.7 °C (98 °F) (Skin)   Resp 16   Ht 1.626 m (5' 4\")   Wt 65.1 kg (143 lb 9.6 oz)   SpO2 99%   BMI 24.65 kg/m²   Visit Vitals  /81 (BP Location: Left arm, Patient Position: Sitting, BP Cuff Size: Adult)   Pulse 70   Temp 36.7 °C (98 °F) (Skin)   Resp 16   Ht 1.626 m (5' 4\")   Wt 65.1 kg (143 lb 9.6 oz)   SpO2 99%   BMI 24.65 kg/m²   OB Status Postmenopausal   Smoking Status Never   BSA 1.71 m²        Physical Exam  General appearance: Well-nourished well-appearing.  HEENT: PERRL, EOMI  Neck: Supple, no nodes.  CV: RRR, no MGR.  Lungs: Clear, no rales or wheezes.  Abdomen: Soft, nontender. No hepatosplenomegaly.  Extremities:  No cyanosis, clubbing, or edema.  MS: No synovitis.          Mild bony prominence of right third and fifth PIP joints, left " second and fifth PIP joints.  She lacks a few degrees of extension of both elbows, right greater than left.  S/p left hip replacement.  Skin: No rashes or nodules.  No psoriatic plaques seen today.         Assessment/Plan   Problem List Items Addressed This Visit             ICD-10-CM    Psoriatic arthropathy (Multi) L40.50    BMI 24.0-24.9, adult - Primary Z68.24     1. Psoriatic arthritis- currently doing well.      2.  Osteoporosis by DEXA May 2024-also has history of left distal radius fracture 1/22- required surgery 2/22.   Started alendronate 70 mg orally once weekly 7/24.    3. BMI 24- stable.    4.  OA left hip-  s/p left hip replacement September 5, 2024.    Plan:  Check labs: CBC with diff, CMP, CRP.  Follow-up in 3 months.

## 2025-01-31 LAB
ALBUMIN SERPL-MCNC: 4.4 G/DL (ref 3.6–5.1)
ALP SERPL-CCNC: 48 U/L (ref 37–153)
ALT SERPL-CCNC: 16 U/L (ref 6–29)
ANION GAP SERPL CALCULATED.4IONS-SCNC: 9 MMOL/L (CALC) (ref 7–17)
AST SERPL-CCNC: 23 U/L (ref 10–35)
BASOPHILS # BLD AUTO: 9 CELLS/UL (ref 0–200)
BASOPHILS NFR BLD AUTO: 0.2 %
BILIRUB SERPL-MCNC: 0.7 MG/DL (ref 0.2–1.2)
BUN SERPL-MCNC: 17 MG/DL (ref 7–25)
CALCIUM SERPL-MCNC: 9 MG/DL (ref 8.6–10.4)
CHLORIDE SERPL-SCNC: 102 MMOL/L (ref 98–110)
CO2 SERPL-SCNC: 26 MMOL/L (ref 20–32)
CREAT SERPL-MCNC: 0.69 MG/DL (ref 0.5–1.05)
CRP SERPL-MCNC: <3 MG/L
EGFRCR SERPLBLD CKD-EPI 2021: 98 ML/MIN/1.73M2
EOSINOPHIL # BLD AUTO: 30 CELLS/UL (ref 15–500)
EOSINOPHIL NFR BLD AUTO: 0.7 %
ERYTHROCYTE [DISTWIDTH] IN BLOOD BY AUTOMATED COUNT: 15.5 % (ref 11–15)
GLUCOSE SERPL-MCNC: 93 MG/DL (ref 65–99)
HCT VFR BLD AUTO: 38.5 % (ref 35–45)
HGB BLD-MCNC: 13.1 G/DL (ref 11.7–15.5)
LYMPHOCYTES # BLD AUTO: 1763 CELLS/UL (ref 850–3900)
LYMPHOCYTES NFR BLD AUTO: 41 %
MCH RBC QN AUTO: 29.8 PG (ref 27–33)
MCHC RBC AUTO-ENTMCNC: 34 G/DL (ref 32–36)
MCV RBC AUTO: 87.5 FL (ref 80–100)
MONOCYTES # BLD AUTO: 529 CELLS/UL (ref 200–950)
MONOCYTES NFR BLD AUTO: 12.3 %
NEUTROPHILS # BLD AUTO: 1969 CELLS/UL (ref 1500–7800)
NEUTROPHILS NFR BLD AUTO: 45.8 %
PLATELET # BLD AUTO: 223 THOUSAND/UL (ref 140–400)
PMV BLD REES-ECKER: 9.1 FL (ref 7.5–12.5)
POTASSIUM SERPL-SCNC: 4.4 MMOL/L (ref 3.5–5.3)
PROT SERPL-MCNC: 7.1 G/DL (ref 6.1–8.1)
RBC # BLD AUTO: 4.4 MILLION/UL (ref 3.8–5.1)
SODIUM SERPL-SCNC: 137 MMOL/L (ref 135–146)
WBC # BLD AUTO: 4.3 THOUSAND/UL (ref 3.8–10.8)

## 2025-02-21 DIAGNOSIS — L40.50 PSORIATIC ARTHRITIS (MULTI): ICD-10-CM

## 2025-02-21 RX ORDER — METHOTREXATE 2.5 MG/1
TABLET ORAL
Qty: 24 TABLET | Refills: 2 | Status: SHIPPED | OUTPATIENT
Start: 2025-02-21

## 2025-03-12 ASSESSMENT — PROMIS GLOBAL HEALTH SCALE
CARRYOUT_PHYSICAL_ACTIVITIES: COMPLETELY
RATE_GENERAL_HEALTH: VERY GOOD
RATE_QUALITY_OF_LIFE: VERY GOOD
CARRYOUT_SOCIAL_ACTIVITIES: VERY GOOD
RATE_AVERAGE_PAIN: 2
RATE_AVERAGE_FATIGUE: MILD
RATE_MENTAL_HEALTH: VERY GOOD
EMOTIONAL_PROBLEMS: RARELY
RATE_SOCIAL_SATISFACTION: VERY GOOD
RATE_PHYSICAL_HEALTH: VERY GOOD

## 2025-03-14 ENCOUNTER — APPOINTMENT (OUTPATIENT)
Dept: PRIMARY CARE | Facility: CLINIC | Age: 65
End: 2025-03-14
Payer: COMMERCIAL

## 2025-03-14 VITALS
WEIGHT: 144.6 LBS | BODY MASS INDEX: 24.69 KG/M2 | DIASTOLIC BLOOD PRESSURE: 71 MMHG | HEART RATE: 63 BPM | OXYGEN SATURATION: 97 % | HEIGHT: 64 IN | SYSTOLIC BLOOD PRESSURE: 109 MMHG

## 2025-03-14 DIAGNOSIS — M85.89 OSTEOPENIA OF MULTIPLE SITES: ICD-10-CM

## 2025-03-14 DIAGNOSIS — L40.50 PSORIATIC ARTHROPATHY (MULTI): ICD-10-CM

## 2025-03-14 DIAGNOSIS — Z00.00 ANNUAL PHYSICAL EXAM: Primary | ICD-10-CM

## 2025-03-14 PROBLEM — M79.7 FIBROMYOSITIS: Status: RESOLVED | Noted: 2023-10-23 | Resolved: 2025-03-14

## 2025-03-14 PROBLEM — M54.16 LUMBAR RADICULOPATHY: Status: RESOLVED | Noted: 2023-10-12 | Resolved: 2025-03-14

## 2025-03-14 PROBLEM — S52.90XA RADIUS FRACTURE: Status: RESOLVED | Noted: 2023-09-23 | Resolved: 2025-03-14

## 2025-03-14 PROBLEM — R51.9 HEADACHE: Status: RESOLVED | Noted: 2023-10-23 | Resolved: 2025-03-14

## 2025-03-14 PROBLEM — Z96.642 S/P TOTAL LEFT HIP ARTHROPLASTY: Status: ACTIVE | Noted: 2024-09-05

## 2025-03-14 PROBLEM — J02.9 ACUTE PHARYNGITIS: Status: RESOLVED | Noted: 2023-10-23 | Resolved: 2025-03-14

## 2025-03-14 PROBLEM — N95.2 ATROPHY OF VAGINA: Status: RESOLVED | Noted: 2022-09-26 | Resolved: 2025-03-14

## 2025-03-14 PROBLEM — M25.552 LEFT HIP PAIN: Status: RESOLVED | Noted: 2024-04-02 | Resolved: 2025-03-14

## 2025-03-14 PROBLEM — N39.0 URINARY TRACT INFECTIOUS DISEASE: Status: RESOLVED | Noted: 2018-04-20 | Resolved: 2025-03-14

## 2025-03-14 PROBLEM — M71.9 DISORDER OF BURSAE OF SHOULDER REGION: Status: RESOLVED | Noted: 2018-04-20 | Resolved: 2025-03-14

## 2025-03-14 PROBLEM — J20.9 ACUTE BRONCHITIS: Status: RESOLVED | Noted: 2023-10-23 | Resolved: 2025-03-14

## 2025-03-14 PROBLEM — H61.20 IMPACTED CERUMEN: Status: RESOLVED | Noted: 2023-10-23 | Resolved: 2025-03-14

## 2025-03-14 PROBLEM — J01.10 ACUTE FRONTAL SINUSITIS: Status: RESOLVED | Noted: 2023-10-23 | Resolved: 2025-03-14

## 2025-03-14 PROBLEM — M24.521: Status: RESOLVED | Noted: 2023-09-23 | Resolved: 2025-03-14

## 2025-03-14 PROBLEM — M24.821: Status: RESOLVED | Noted: 2023-09-23 | Resolved: 2025-03-14

## 2025-03-14 PROBLEM — M62.830 SPASM OF THORACIC BACK MUSCLE: Status: RESOLVED | Noted: 2023-09-23 | Resolved: 2025-03-14

## 2025-03-14 PROBLEM — R05.9 COUGH: Status: RESOLVED | Noted: 2023-10-23 | Resolved: 2025-03-14

## 2025-03-14 PROBLEM — R35.0 INCREASED FREQUENCY OF URINATION: Status: RESOLVED | Noted: 2023-10-23 | Resolved: 2025-03-14

## 2025-03-14 PROBLEM — M77.8 TENDINITIS OF RIGHT ELBOW: Status: RESOLVED | Noted: 2023-09-23 | Resolved: 2025-03-14

## 2025-03-14 PROBLEM — M16.12 PRIMARY OSTEOARTHRITIS OF LEFT HIP: Status: RESOLVED | Noted: 2024-04-17 | Resolved: 2025-03-14

## 2025-03-14 PROBLEM — M71.9 BURSITIS: Status: RESOLVED | Noted: 2023-10-23 | Resolved: 2025-03-14

## 2025-03-14 PROBLEM — H66.90 OTITIS MEDIA: Status: RESOLVED | Noted: 2023-10-23 | Resolved: 2025-03-14

## 2025-03-14 PROCEDURE — 1036F TOBACCO NON-USER: CPT | Performed by: INTERNAL MEDICINE

## 2025-03-14 PROCEDURE — 99396 PREV VISIT EST AGE 40-64: CPT | Performed by: INTERNAL MEDICINE

## 2025-03-14 PROCEDURE — 3008F BODY MASS INDEX DOCD: CPT | Performed by: INTERNAL MEDICINE

## 2025-03-14 ASSESSMENT — ENCOUNTER SYMPTOMS: DEPRESSION: 0

## 2025-03-14 ASSESSMENT — PATIENT HEALTH QUESTIONNAIRE - PHQ9
SUM OF ALL RESPONSES TO PHQ9 QUESTIONS 1 AND 2: 0
1. LITTLE INTEREST OR PLEASURE IN DOING THINGS: NOT AT ALL
2. FEELING DOWN, DEPRESSED OR HOPELESS: NOT AT ALL

## 2025-03-14 NOTE — PROGRESS NOTES
"Subjective   Patient ID: Maryann Brennan is a 64 y.o. female who presents for Annual Exam.    HPI   Had L hip replaced last year, doing well.    Exercises regularly, works with a   Diet healthy    Reviewed recent labs, CBC ok, CMP ok, lipids ok.    Last dexa with progressive osteopenia, Rheum started alendronate, she is tolerating.    On humira for psoriatic arthritis which is manageable with this.    Review of Systems    Objective   /71 (BP Location: Left arm, Patient Position: Sitting)   Pulse 63   Ht 1.626 m (5' 4\")   Wt 65.6 kg (144 lb 9.6 oz)   SpO2 97%   BMI 24.82 kg/m²     Physical Exam  Constitutional:       Appearance: Normal appearance.   HENT:      Right Ear: Tympanic membrane and ear canal normal.      Left Ear: Tympanic membrane and ear canal normal.      Mouth/Throat:      Mouth: Mucous membranes are moist.   Eyes:      Extraocular Movements: Extraocular movements intact.      Pupils: Pupils are equal, round, and reactive to light.   Cardiovascular:      Rate and Rhythm: Normal rate and regular rhythm.      Heart sounds: No murmur heard.  Pulmonary:      Effort: Pulmonary effort is normal.      Breath sounds: Normal breath sounds.   Abdominal:      General: Bowel sounds are normal.      Palpations: Abdomen is soft.      Tenderness: There is no abdominal tenderness.   Musculoskeletal:      Cervical back: Neck supple.      Right lower leg: No edema.      Left lower leg: No edema.   Neurological:      Mental Status: She is alert.      Cranial Nerves: No cranial nerve deficit.      Motor: No weakness.      Gait: Gait normal.         Assessment/Plan   Problem List Items Addressed This Visit             ICD-10-CM    Psoriatic arthropathy (Multi) L40.50    Osteopenia of multiple sites M85.89    Annual physical exam - Primary Z00.00    Relevant Orders    CBC    Comprehensive Metabolic Panel    Lipid Panel           L hip OA s/p replacement    Psoriatic arthritis - Sees Rheum    Osteopenia " - on alendronate through [facility]     Health maintenance:  -Colon cancer screening - 12/2023, Dr Vollweiler, normal, 10 years  -Pap - Sees Gyn  -Mammogram - UTD through Gyn  -Immunizations    -Pneumonia - UTD   -Shingles - Had shingrix     F/u 1 yr

## 2025-03-15 LAB
ALBUMIN SERPL-MCNC: 4.5 G/DL (ref 3.6–5.1)
ALP SERPL-CCNC: 53 U/L (ref 37–153)
ALT SERPL-CCNC: 17 U/L (ref 6–29)
ANION GAP SERPL CALCULATED.4IONS-SCNC: 9 MMOL/L (CALC) (ref 7–17)
AST SERPL-CCNC: 24 U/L (ref 10–35)
BILIRUB SERPL-MCNC: 0.6 MG/DL (ref 0.2–1.2)
BUN SERPL-MCNC: 15 MG/DL (ref 7–25)
CALCIUM SERPL-MCNC: 9.2 MG/DL (ref 8.6–10.4)
CHLORIDE SERPL-SCNC: 103 MMOL/L (ref 98–110)
CHOLEST SERPL-MCNC: 197 MG/DL
CHOLEST/HDLC SERPL: 2.2 (CALC)
CO2 SERPL-SCNC: 27 MMOL/L (ref 20–32)
CREAT SERPL-MCNC: 0.75 MG/DL (ref 0.5–1.05)
EGFRCR SERPLBLD CKD-EPI 2021: 90 ML/MIN/1.73M2
ERYTHROCYTE [DISTWIDTH] IN BLOOD BY AUTOMATED COUNT: 13.5 % (ref 11–15)
GLUCOSE SERPL-MCNC: 93 MG/DL (ref 65–99)
HCT VFR BLD AUTO: 39.9 % (ref 35–45)
HDLC SERPL-MCNC: 88 MG/DL
HGB BLD-MCNC: 13.1 G/DL (ref 11.7–15.5)
LDLC SERPL CALC-MCNC: 96 MG/DL (CALC)
MCH RBC QN AUTO: 30 PG (ref 27–33)
MCHC RBC AUTO-ENTMCNC: 32.8 G/DL (ref 32–36)
MCV RBC AUTO: 91.5 FL (ref 80–100)
NONHDLC SERPL-MCNC: 109 MG/DL (CALC)
PLATELET # BLD AUTO: 220 THOUSAND/UL (ref 140–400)
PMV BLD REES-ECKER: 9.2 FL (ref 7.5–12.5)
POTASSIUM SERPL-SCNC: 4.3 MMOL/L (ref 3.5–5.3)
PROT SERPL-MCNC: 7.1 G/DL (ref 6.1–8.1)
RBC # BLD AUTO: 4.36 MILLION/UL (ref 3.8–5.1)
SODIUM SERPL-SCNC: 139 MMOL/L (ref 135–146)
TRIGL SERPL-MCNC: 52 MG/DL
WBC # BLD AUTO: 4.5 THOUSAND/UL (ref 3.8–10.8)

## 2025-03-18 ENCOUNTER — TELEPHONE (OUTPATIENT)
Dept: PRIMARY CARE | Facility: CLINIC | Age: 65
End: 2025-03-18
Payer: COMMERCIAL

## 2025-03-18 NOTE — TELEPHONE ENCOUNTER
Called patient to see if she needed her biometric screening form faxed someone.  She just asked that I mail it to her home

## 2025-05-01 ENCOUNTER — APPOINTMENT (OUTPATIENT)
Dept: RHEUMATOLOGY | Facility: CLINIC | Age: 65
End: 2025-05-01
Payer: COMMERCIAL

## 2025-05-06 ENCOUNTER — APPOINTMENT (OUTPATIENT)
Dept: RHEUMATOLOGY | Facility: CLINIC | Age: 65
End: 2025-05-06
Payer: COMMERCIAL

## 2025-05-06 VITALS
RESPIRATION RATE: 14 BRPM | SYSTOLIC BLOOD PRESSURE: 114 MMHG | DIASTOLIC BLOOD PRESSURE: 66 MMHG | HEIGHT: 64 IN | BODY MASS INDEX: 25.1 KG/M2 | WEIGHT: 147 LBS | HEART RATE: 67 BPM | OXYGEN SATURATION: 99 %

## 2025-05-06 DIAGNOSIS — L40.50 PSORIATIC ARTHROPATHY (MULTI): Primary | ICD-10-CM

## 2025-05-06 PROCEDURE — 99214 OFFICE O/P EST MOD 30 MIN: CPT | Performed by: INTERNAL MEDICINE

## 2025-05-06 PROCEDURE — 1036F TOBACCO NON-USER: CPT | Performed by: INTERNAL MEDICINE

## 2025-05-06 PROCEDURE — 3008F BODY MASS INDEX DOCD: CPT | Performed by: INTERNAL MEDICINE

## 2025-05-06 ASSESSMENT — PAIN SCALES - GENERAL: PAINLEVEL_OUTOF10: 0-NO PAIN

## 2025-05-06 NOTE — PATIENT INSTRUCTIONS
Check labs: CBC with diff, CMP, CRP.  Please check which biosimilar drug for Humira is covered by your insurance.  Follow-up in 3 months.

## 2025-05-06 NOTE — PROGRESS NOTES
Subjective   Patient ID: Maryann Brennan is a 64 y.o. female who presents for regarding psoriatic arthritis.    HPI  63 yo F here for follow-up regarding psoriatic arthritis. Her psoriasis started in her scalp in 2005.   Her joint pain started in 2011.      She remains on methotrexate to 15 mg orally weekly (dose reduced 11/17 because WBC 2.6) , folic acid 1 mg daily, and Humira 40 mg subcutaneous every 2 weeks.     She got a message from her mail-order pharmacy that namebrand Humira would no longer be covered and she needs to change to a biosimilar drug.  They did not specify which biosimilar drug is covered, so I requested that she get that information.     She is overall doing well. She is not having much morning stiffness. Skin is doing well.    She had her left hip replaced September 2024 .  She was off Humira for about 8 weeks, due to the timing of the surgery.     X-ray lumbar spine April 2, 2024 shows mild to space narrowing at L5-S1, and mild facet disease in the lower lumbar spine.    She did fall on ice 1/22, suffered a fracture of her distal left radius which required surgery 2/22.     DEXA May 2022: T score -2.2 left femoral neck (Z score -1.0, decline of 8%), T score -2.2 left total hip (Z score -1.3, decline of 8.9%, T score -2.3 lumbar spine (Z score -1.1, decline of 4%).  Estimated 10-year fracture risk by FRAX is 17.3% for major osteoporotic fracture and 2.8% for hip fracture.  DEXA April 2024: T-score -3.1 left total hip (declined to 16.3%, Z-score -2.1), T-score -2.4 left femoral neck (decline of 4.2%, Z-score -1.1), T-score -2.3 lumbar spine (stable)     She does take calcium 500 mg daily and vitamin D 4000 IU daily.     Past history of treatment:   MTX started 2011 , increased to 20 mg weekly 6/16.   Enbrel- had secondary failure after 1 1/2 years.   Humira started 5/15.      x-rays of her hands done July 2019. The left index finger PIP joint shows soft tissue swelling, perhaps mild joint  "space narrowing but no erosions.     Labs October 2024: CBC normal except white blood cell count 3.8, CMP normal, CRP 0.11 (normal less than 1), cholesterol 203, HDL 87, , triglycerides 45  Labs March 2025: CBC normal, CMP normal, cholesterol 197, HDL 88, LDL 96, triglycerides 52     Health maintenance:   Prevnar-13  7/21, pneumovax 1/23   Pfizer COVID-19 vaccine March 15, 2021, April 5, 2021, booster 11/06/21, BV Pfizer booster 11/22  Pfizer COVID vaccine 11/10/24   Flu shot 10/24   Tdap 5/17/21     Medical problem list:  Psoriatic arthritis  Osteoporosis    Surgeries:  Left hip replacement September 2024  Left distal radius fracture 2/22  D&C 2015     ROS:  General: Denies fevers or chills.  CV: Denies chest pain or palpitations.  Denies leg edema.  Lungs: Denies coughing or shortness of breath.  Skin: Denies rashes or nodules.  MS: Denies joint pain or joint swelling.      Objective   /66   Pulse 67   Resp 14   Ht 1.626 m (5' 4\")   Wt 66.7 kg (147 lb)   SpO2 99%   BMI 25.23 kg/m²   Visit Vitals  /66   Pulse 67   Resp 14   Ht 1.626 m (5' 4\")   Wt 66.7 kg (147 lb)   SpO2 99%   BMI 25.23 kg/m²   OB Status Postmenopausal   Smoking Status Never   BSA 1.74 m²        Physical Exam  General appearance: Well-nourished well-appearing.  HEENT: PERRL, EOMI  Neck: Supple, no nodes.  CV: RRR, no MGR.  Lungs: Clear, no rales or wheezes.  Abdomen: Soft, nontender. No hepatosplenomegaly.  Extremities:  No cyanosis, clubbing, or edema.  MS: No synovitis.          Mild bony prominence of right third and fifth PIP joints, left second and fifth PIP joints.  She lacks a few degrees of extension of both elbows, right greater than left.  S/p left hip replacement.  Skin: No rashes or nodules.  No psoriatic plaques seen today.         Assessment/Plan   Problem List Items Addressed This Visit           ICD-10-CM    Psoriatic arthropathy (Multi) - Primary L40.50    BMI 25.0-25.9,adult Z68.25         1. Psoriatic " arthritis- currently doing well.      2.  Osteoporosis by DEXA May 2024-also has history of left distal radius fracture 1/22- required surgery 2/22.   Started alendronate 70 mg orally once weekly 7/24.    3. BMI 25- stable.    4.  OA left hip-  s/p left hip replacement September 5, 2024.    Plan:  Check labs: CBC with diff, CMP, CRP.  Please check which biosimilar drug for Humira is covered by your insurance.  Follow-up in 3 months.

## 2025-06-16 DIAGNOSIS — L40.50 PSORIATIC ARTHRITIS (MULTI): ICD-10-CM

## 2025-06-16 RX ORDER — FOLIC ACID 1 MG/1
1 TABLET ORAL DAILY
Qty: 90 TABLET | Refills: 3 | Status: SHIPPED | OUTPATIENT
Start: 2025-06-16

## 2025-06-24 DIAGNOSIS — M81.0 AGE-RELATED OSTEOPOROSIS WITHOUT CURRENT PATHOLOGICAL FRACTURE: ICD-10-CM

## 2025-06-24 RX ORDER — ALENDRONATE SODIUM 70 MG/1
TABLET ORAL
Qty: 12 TABLET | Refills: 3 | Status: SHIPPED | OUTPATIENT
Start: 2025-06-24

## 2025-06-25 DIAGNOSIS — L40.50 PSORIATIC ARTHRITIS (MULTI): ICD-10-CM

## 2025-06-26 RX ORDER — METHOTREXATE 2.5 MG/1
TABLET ORAL
Qty: 24 TABLET | Refills: 3 | Status: SHIPPED | OUTPATIENT
Start: 2025-06-26

## 2025-07-08 DIAGNOSIS — L40.50 PSORIATIC ARTHROPATHY (MULTI): Primary | ICD-10-CM

## 2025-07-08 RX ORDER — ADALIMUMAB-RYVK 40MG/0.4ML
40 KIT SUBCUTANEOUS
Qty: 1 EACH | Refills: 6 | Status: SHIPPED | OUTPATIENT
Start: 2025-07-08 | End: 2025-07-10 | Stop reason: SDUPTHER

## 2025-07-10 ENCOUNTER — TELEPHONE (OUTPATIENT)
Dept: PRIMARY CARE | Facility: CLINIC | Age: 65
End: 2025-07-10
Payer: COMMERCIAL

## 2025-07-10 DIAGNOSIS — L40.50 PSORIATIC ARTHRITIS (MULTI): ICD-10-CM

## 2025-07-10 DIAGNOSIS — L40.50 PSORIATIC ARTHROPATHY (MULTI): ICD-10-CM

## 2025-07-10 RX ORDER — ADALIMUMAB-RYVK 40MG/0.4ML
40 KIT SUBCUTANEOUS
Qty: 1 EACH | Refills: 6 | Status: SHIPPED | OUTPATIENT
Start: 2025-07-10 | End: 2025-07-10 | Stop reason: SDUPTHER

## 2025-07-10 RX ORDER — ADALIMUMAB-RYVK 40MG/0.4ML
40 KIT SUBCUTANEOUS
Qty: 1 EACH | Refills: 6 | Status: SHIPPED | OUTPATIENT
Start: 2025-07-10

## 2025-07-10 NOTE — TELEPHONE ENCOUNTER
[facility]; Neda Hidalgo MD  [facility], adalimumab ryvk was approved through 07/10/2025 case Id: [id number], please reroute to [facility]. Thanks!

## 2025-08-12 ENCOUNTER — APPOINTMENT (OUTPATIENT)
Dept: RHEUMATOLOGY | Facility: CLINIC | Age: 65
End: 2025-08-12
Payer: COMMERCIAL

## 2025-08-12 VITALS
BODY MASS INDEX: 24.92 KG/M2 | HEART RATE: 65 BPM | WEIGHT: 146 LBS | SYSTOLIC BLOOD PRESSURE: 118 MMHG | HEIGHT: 64 IN | RESPIRATION RATE: 16 BRPM | DIASTOLIC BLOOD PRESSURE: 64 MMHG | OXYGEN SATURATION: 98 %

## 2025-08-12 DIAGNOSIS — L40.50 PSORIATIC ARTHROPATHY (MULTI): Primary | ICD-10-CM

## 2025-08-12 PROCEDURE — 3008F BODY MASS INDEX DOCD: CPT | Performed by: INTERNAL MEDICINE

## 2025-08-12 PROCEDURE — 1036F TOBACCO NON-USER: CPT | Performed by: INTERNAL MEDICINE

## 2025-08-12 PROCEDURE — 99214 OFFICE O/P EST MOD 30 MIN: CPT | Performed by: INTERNAL MEDICINE

## 2025-08-12 ASSESSMENT — PAIN SCALES - GENERAL: PAINLEVEL_OUTOF10: 1

## 2025-08-13 LAB
ALBUMIN SERPL-MCNC: 4.4 G/DL (ref 3.6–5.1)
ALP SERPL-CCNC: 48 U/L (ref 37–153)
ALT SERPL-CCNC: 16 U/L (ref 6–29)
ANION GAP SERPL CALCULATED.4IONS-SCNC: 6 MMOL/L (CALC) (ref 7–17)
AST SERPL-CCNC: 22 U/L (ref 10–35)
BASOPHILS # BLD AUTO: 30 CELLS/UL (ref 0–200)
BASOPHILS NFR BLD AUTO: 0.6 %
BILIRUB SERPL-MCNC: 0.6 MG/DL (ref 0.2–1.2)
BUN SERPL-MCNC: 15 MG/DL (ref 7–25)
CALCIUM SERPL-MCNC: 8.9 MG/DL (ref 8.6–10.4)
CHLORIDE SERPL-SCNC: 101 MMOL/L (ref 98–110)
CO2 SERPL-SCNC: 29 MMOL/L (ref 20–32)
CREAT SERPL-MCNC: 0.78 MG/DL (ref 0.5–1.05)
CRP SERPL-MCNC: <3 MG/L
EGFRCR SERPLBLD CKD-EPI 2021: 85 ML/MIN/1.73M2
EOSINOPHIL # BLD AUTO: 160 CELLS/UL (ref 15–500)
EOSINOPHIL NFR BLD AUTO: 3.2 %
ERYTHROCYTE [DISTWIDTH] IN BLOOD BY AUTOMATED COUNT: 14.1 % (ref 11–15)
GLUCOSE SERPL-MCNC: 93 MG/DL (ref 65–99)
HCT VFR BLD AUTO: 40.2 % (ref 35–45)
HGB BLD-MCNC: 13.3 G/DL (ref 11.7–15.5)
LYMPHOCYTES # BLD AUTO: 2030 CELLS/UL (ref 850–3900)
LYMPHOCYTES NFR BLD AUTO: 40.6 %
MCH RBC QN AUTO: 30.9 PG (ref 27–33)
MCHC RBC AUTO-ENTMCNC: 33.1 G/DL (ref 32–36)
MCV RBC AUTO: 93.5 FL (ref 80–100)
MONOCYTES # BLD AUTO: 705 CELLS/UL (ref 200–950)
MONOCYTES NFR BLD AUTO: 14.1 %
NEUTROPHILS # BLD AUTO: 2075 CELLS/UL (ref 1500–7800)
NEUTROPHILS NFR BLD AUTO: 41.5 %
PLATELET # BLD AUTO: 198 THOUSAND/UL (ref 140–400)
PMV BLD REES-ECKER: 8.8 FL (ref 7.5–12.5)
POTASSIUM SERPL-SCNC: 4.5 MMOL/L (ref 3.5–5.3)
PROT SERPL-MCNC: 6.6 G/DL (ref 6.1–8.1)
RBC # BLD AUTO: 4.3 MILLION/UL (ref 3.8–5.1)
SODIUM SERPL-SCNC: 136 MMOL/L (ref 135–146)
WBC # BLD AUTO: 5 THOUSAND/UL (ref 3.8–10.8)

## 2025-11-18 ENCOUNTER — APPOINTMENT (OUTPATIENT)
Dept: RHEUMATOLOGY | Facility: CLINIC | Age: 65
End: 2025-11-18
Payer: COMMERCIAL